# Patient Record
Sex: FEMALE | Race: WHITE | NOT HISPANIC OR LATINO | URBAN - METROPOLITAN AREA
[De-identification: names, ages, dates, MRNs, and addresses within clinical notes are randomized per-mention and may not be internally consistent; named-entity substitution may affect disease eponyms.]

---

## 2017-02-15 ENCOUNTER — OUTPATIENT (OUTPATIENT)
Dept: OUTPATIENT SERVICES | Facility: HOSPITAL | Age: 67
LOS: 1 days | Discharge: HOME | End: 2017-02-15

## 2017-06-27 DIAGNOSIS — Z12.31 ENCOUNTER FOR SCREENING MAMMOGRAM FOR MALIGNANT NEOPLASM OF BREAST: ICD-10-CM

## 2017-09-25 ENCOUNTER — OUTPATIENT (OUTPATIENT)
Dept: OUTPATIENT SERVICES | Facility: HOSPITAL | Age: 67
LOS: 1 days | Discharge: HOME | End: 2017-09-25

## 2017-09-25 DIAGNOSIS — C83.58 LYMPHOBLASTIC (DIFFUSE) LYMPHOMA, LYMPH NODES OF MULTIPLE SITES: ICD-10-CM

## 2017-10-30 ENCOUNTER — EMERGENCY (EMERGENCY)
Facility: HOSPITAL | Age: 67
LOS: 0 days | Discharge: HOME | End: 2017-10-30

## 2017-10-30 DIAGNOSIS — Z88.0 ALLERGY STATUS TO PENICILLIN: ICD-10-CM

## 2017-10-30 DIAGNOSIS — S60.511A ABRASION OF RIGHT HAND, INITIAL ENCOUNTER: ICD-10-CM
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
## 2017-10-30 DIAGNOSIS — M25.572 PAIN IN LEFT ANKLE AND JOINTS OF LEFT FOOT: ICD-10-CM

## 2017-10-30 DIAGNOSIS — Z23 ENCOUNTER FOR IMMUNIZATION: ICD-10-CM
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
## 2018-03-01 ENCOUNTER — OUTPATIENT (OUTPATIENT)
Dept: OUTPATIENT SERVICES | Facility: HOSPITAL | Age: 68
LOS: 1 days | Discharge: HOME | End: 2018-03-01

## 2018-03-01 DIAGNOSIS — Z12.31 ENCOUNTER FOR SCREENING MAMMOGRAM FOR MALIGNANT NEOPLASM OF BREAST: ICD-10-CM

## 2018-03-02 ENCOUNTER — OUTPATIENT (OUTPATIENT)
Dept: OUTPATIENT SERVICES | Facility: HOSPITAL | Age: 68
LOS: 1 days | Discharge: HOME | End: 2018-03-02

## 2018-03-02 DIAGNOSIS — R42 DIZZINESS AND GIDDINESS: ICD-10-CM

## 2018-05-07 ENCOUNTER — APPOINTMENT (OUTPATIENT)
Dept: CARDIOLOGY | Facility: CLINIC | Age: 68
End: 2018-05-07

## 2018-05-07 VITALS
WEIGHT: 132 LBS | HEIGHT: 63 IN | HEART RATE: 87 BPM | BODY MASS INDEX: 23.39 KG/M2 | SYSTOLIC BLOOD PRESSURE: 98 MMHG | DIASTOLIC BLOOD PRESSURE: 62 MMHG

## 2018-05-07 DIAGNOSIS — R20.0 ANESTHESIA OF SKIN: ICD-10-CM

## 2018-05-07 DIAGNOSIS — R20.2 PARESTHESIA OF SKIN: ICD-10-CM

## 2018-05-07 DIAGNOSIS — Z80.0 FAMILY HISTORY OF MALIGNANT NEOPLASM OF DIGESTIVE ORGANS: ICD-10-CM

## 2018-05-07 DIAGNOSIS — Z87.39 PERSONAL HISTORY OF OTHER DISEASES OF THE MUSCULOSKELETAL SYSTEM AND CONNECTIVE TISSUE: ICD-10-CM

## 2018-06-14 ENCOUNTER — APPOINTMENT (OUTPATIENT)
Dept: CARDIOLOGY | Facility: CLINIC | Age: 68
End: 2018-06-14

## 2018-09-25 ENCOUNTER — OUTPATIENT (OUTPATIENT)
Dept: OUTPATIENT SERVICES | Facility: HOSPITAL | Age: 68
LOS: 1 days | Discharge: HOME | End: 2018-09-25

## 2018-09-25 DIAGNOSIS — R51 HEADACHE: ICD-10-CM

## 2018-11-04 ENCOUNTER — OUTPATIENT (OUTPATIENT)
Dept: OUTPATIENT SERVICES | Facility: HOSPITAL | Age: 68
LOS: 1 days | Discharge: HOME | End: 2018-11-04

## 2018-11-04 DIAGNOSIS — G45.8 OTHER TRANSIENT CEREBRAL ISCHEMIC ATTACKS AND RELATED SYNDROMES: ICD-10-CM

## 2018-11-04 DIAGNOSIS — R42 DIZZINESS AND GIDDINESS: ICD-10-CM

## 2018-11-04 DIAGNOSIS — R51 HEADACHE: ICD-10-CM

## 2019-01-18 ENCOUNTER — LABORATORY RESULT (OUTPATIENT)
Age: 69
End: 2019-01-18

## 2019-01-18 ENCOUNTER — APPOINTMENT (OUTPATIENT)
Dept: HEMATOLOGY ONCOLOGY | Facility: CLINIC | Age: 69
End: 2019-01-18

## 2019-01-18 VITALS
TEMPERATURE: 96.5 F | HEART RATE: 101 BPM | BODY MASS INDEX: 22.86 KG/M2 | RESPIRATION RATE: 16 BRPM | HEIGHT: 63 IN | DIASTOLIC BLOOD PRESSURE: 72 MMHG | SYSTOLIC BLOOD PRESSURE: 121 MMHG | WEIGHT: 129 LBS

## 2019-01-18 LAB
ALBUMIN SERPL ELPH-MCNC: 4.6 G/DL
ALP BLD-CCNC: 65 U/L
ALT SERPL-CCNC: 13 U/L
ANION GAP SERPL CALC-SCNC: 20 MMOL/L
APTT BLD: 31.9 SEC
AST SERPL-CCNC: 16 U/L
BILIRUB DIRECT SERPL-MCNC: <0.2 MG/DL
BILIRUB INDIRECT SERPL-MCNC: >0.1 MG/DL
BILIRUB SERPL-MCNC: 0.3 MG/DL
BUN SERPL-MCNC: 15 MG/DL
CALCIUM SERPL-MCNC: 9.8 MG/DL
CHLORIDE SERPL-SCNC: 106 MMOL/L
CO2 SERPL-SCNC: 24 MMOL/L
CREAT SERPL-MCNC: 0.9 MG/DL
GLUCOSE SERPL-MCNC: 86 MG/DL
HCT VFR BLD CALC: 40.9 %
HGB BLD-MCNC: 12.9 G/DL
INR PPP: 1.02 RATIO
LDH SERPL-CCNC: 208
MCHC RBC-ENTMCNC: 28.4 PG
MCHC RBC-ENTMCNC: 31.5 G/DL
MCV RBC AUTO: 90.1 FL
PLATELET # BLD AUTO: 294 K/UL
PMV BLD: 11.1 FL
POTASSIUM SERPL-SCNC: 5.4 MMOL/L
PROT SERPL-MCNC: 7.3 G/DL
PT BLD: 11.7 SEC
RBC # BLD: 4.54 M/UL
RBC # FLD: 14.8 %
SODIUM SERPL-SCNC: 150 MMOL/L
WBC # FLD AUTO: 6.74 K/UL

## 2019-01-18 NOTE — REVIEW OF SYSTEMS
[Fatigue] : fatigue [Negative] : Allergic/Immunologic [Fever] : no fever [Chills] : no chills [Night Sweats] : no night sweats [Recent Change In Weight] : ~T no recent weight change

## 2019-01-18 NOTE — HISTORY OF PRESENT ILLNESS
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 Each time treated with RT. \par \par Biopsy on 12.5.18: left preauricular area: limited findings; can not assess the depth; differential includes pseudolymphoma or evolving lymphoproliferative disorder\par \par PMH: follicular lymphoma stage IE\par FH: colon cancer in father and brother; lymhohistocytosis in mother\par SH; no smoking

## 2019-01-18 NOTE — ASSESSMENT
[Palliative] : Goals of care discussed with patient: Palliative [FreeTextEntry1] : Folliucular Lymphoma: \par \par 1.send to surgery to repeat biopsy\par 2. f/u Rad Onc\par 3. repeat pet scan\par 4. labs\par \par if lymphoma is diagnosed and RT is not an option, will discuss chemotherapy for palliative not curative intent (rituxan jorge x4).\par \par had prolonged discussion; answered all questions\par \par ADDENDUM: \par : Called a patient. Advised to repeat test asap in ER> she said she will go to her pmd tomorrow\par

## 2019-01-25 ENCOUNTER — OUTPATIENT (OUTPATIENT)
Dept: OUTPATIENT SERVICES | Facility: HOSPITAL | Age: 69
LOS: 1 days | Discharge: HOME | End: 2019-01-25

## 2019-01-25 ENCOUNTER — APPOINTMENT (OUTPATIENT)
Dept: SURGERY | Facility: CLINIC | Age: 69
End: 2019-01-25
Payer: MEDICARE

## 2019-01-25 ENCOUNTER — LABORATORY RESULT (OUTPATIENT)
Age: 69
End: 2019-01-25

## 2019-01-25 VITALS
DIASTOLIC BLOOD PRESSURE: 72 MMHG | SYSTOLIC BLOOD PRESSURE: 124 MMHG | WEIGHT: 129 LBS | HEIGHT: 63 IN | BODY MASS INDEX: 22.86 KG/M2

## 2019-01-25 DIAGNOSIS — C82.90 FOLLICULAR LYMPHOMA, UNSPECIFIED, UNSPECIFIED SITE: ICD-10-CM

## 2019-01-25 PROCEDURE — 11104 PUNCH BX SKIN SINGLE LESION: CPT

## 2019-01-25 PROCEDURE — 11105 PUNCH BX SKIN EA SEP/ADDL: CPT

## 2019-01-25 PROCEDURE — 99202 OFFICE O/P NEW SF 15 MIN: CPT

## 2019-01-25 NOTE — HISTORY OF PRESENT ILLNESS
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 Each time treated with RT. \par Biopsy on 12.5.18: left preauricular area: limited findings; can not assess the depth; differential includes pseudolymphoma or evolving lymphoproliferative disorder\par \par PMH: follicular lymphoma stage IE\par FH: colon cancer in father and brother; lymhohistocytosis in mother\par SH; no smoking.

## 2019-01-25 NOTE — ASSESSMENT
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 Each time treated with RT. \par Biopsy on 12.5.18: left preauricular area: limited findings; can not assess the depth; differential includes pseudolymphoma or evolving lymphoproliferative disorder\par \par \par Procedure:\par  Punch biopsy of face x 2\par \par A consent was taken before the procedure. \par After a time out and cleaning the area with a antiseptic solution, an injection of local anesthesia the with the 2mm punch biopsy device incision was made  in the pre auricular area. The incision was then made deeper and the lesion was excised with a scalpel. \par \par a second such biopsy was done inferior to it. \par Once the lesion was removed it was sent of to pathology. \par The area was irrigated and hemostasis was confirmed.\par \par The incision was closed with absorbable sutures with  Monocryl for the epidermis. \par \par The Post operative care was explained to the patient. She was counselled on diet , exercise and wound care.\par We discussed the pathology and surgery with her.\par We discussed the importance of close follow up. \par We informed that she needs to follow up in 1 week\par We also informed that she can call us if anything changes or has any questions.\par \par

## 2019-01-25 NOTE — PHYSICAL EXAM
[Respiratory Effort] : normal respiratory effort [Calm] : calm [JVD] : no jugular venous distention  [Murmur] : murmur was appreciated  [Purpura] : no purpura  [de-identified] : normal [de-identified] : some scarring of the left face [de-identified] : Normal

## 2019-01-30 ENCOUNTER — OUTPATIENT (OUTPATIENT)
Dept: OUTPATIENT SERVICES | Facility: HOSPITAL | Age: 69
LOS: 1 days | Discharge: HOME | End: 2019-01-30

## 2019-01-30 DIAGNOSIS — C82.90 FOLLICULAR LYMPHOMA, UNSPECIFIED, UNSPECIFIED SITE: ICD-10-CM

## 2019-01-30 DIAGNOSIS — C82.91 FOLLICULAR LYMPHOMA, UNSPECIFIED, LYMPH NODES OF HEAD, FACE, AND NECK: ICD-10-CM

## 2019-01-30 LAB — GLUCOSE BLDC GLUCOMTR-MCNC: 82 MG/DL — SIGNIFICANT CHANGE UP (ref 70–99)

## 2019-02-01 ENCOUNTER — APPOINTMENT (OUTPATIENT)
Dept: SURGERY | Facility: CLINIC | Age: 69
End: 2019-02-01
Payer: MEDICARE

## 2019-02-01 VITALS
BODY MASS INDEX: 23.04 KG/M2 | HEIGHT: 63 IN | DIASTOLIC BLOOD PRESSURE: 68 MMHG | WEIGHT: 130 LBS | SYSTOLIC BLOOD PRESSURE: 120 MMHG

## 2019-02-01 PROCEDURE — 99214 OFFICE O/P EST MOD 30 MIN: CPT

## 2019-02-01 NOTE — HISTORY OF PRESENT ILLNESS
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 Each time treated with RT. \par Biopsy on 12.5.18: left preauricular area: limited findings; can not assess the depth; differential includes pseudolymphoma or evolving lymphoproliferative disorder\par \par PMH: follicular lymphoma stage IE\par FH: colon cancer in father and brother; lympho histocytosis in mother\par SH; no smoking. \par \par On 1/25:  Two biopsies were taken . The pathology is still pending . It has healed well.

## 2019-02-01 NOTE — PHYSICAL EXAM
[Respiratory Effort] : normal respiratory effort [Murmur] : murmur was appreciated  [Calm] : calm [JVD] : no jugular venous distention  [Purpura] : no purpura  [de-identified] : normal [de-identified] : some scarring of the left face [de-identified] : Normal

## 2019-02-08 ENCOUNTER — APPOINTMENT (OUTPATIENT)
Dept: HEMATOLOGY ONCOLOGY | Facility: CLINIC | Age: 69
End: 2019-02-08

## 2019-02-08 ENCOUNTER — OUTPATIENT (OUTPATIENT)
Dept: OUTPATIENT SERVICES | Facility: HOSPITAL | Age: 69
LOS: 1 days | Discharge: HOME | End: 2019-02-08

## 2019-02-08 VITALS
HEART RATE: 96 BPM | TEMPERATURE: 97 F | RESPIRATION RATE: 16 BRPM | BODY MASS INDEX: 23.04 KG/M2 | DIASTOLIC BLOOD PRESSURE: 59 MMHG | SYSTOLIC BLOOD PRESSURE: 103 MMHG | HEIGHT: 63 IN | WEIGHT: 130 LBS

## 2019-02-08 DIAGNOSIS — C82.90 FOLLICULAR LYMPHOMA, UNSPECIFIED, UNSPECIFIED SITE: ICD-10-CM

## 2019-02-08 NOTE — ASSESSMENT
[Palliative] : Goals of care discussed with patient: Palliative [FreeTextEntry1] : Folliucular Lymphoma: \par \par 1 repeat biopsy 1.25.19: negative\par 2. f/u Rad Onc appreciated\par 3. repeat pet scan reviewed: no evidence of distant disease\par \par - repeat bipsy in 2-3 months and see me after that; pt to make an appt w/ surgery to get biopsy done\par if lymphoma is diagnosed and RT is not an option, will discuss chemotherapy for palliative not curative intent (rituxan jorge x4).\par \par had prolonged discussion; answered all questions\par

## 2019-02-08 NOTE — HISTORY OF PRESENT ILLNESS
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 Each time treated with RT. \par \par Biopsy on 12.5.18: left preauricular area: limited findings; can not assess the depth; differential includes pseudolymphoma or evolving lymphoproliferative disorder\par \par PMH: follicular lymphoma stage IE\par FH: colon cancer in father and brother; lymphohistocytosis in mother\par SH; no smoking [de-identified] : 1.25.19: biopsy: no lymphoma\par \par \par PET/CT 1/30/19  IMPRESSION: Since 9/25/2017: 1. No definite sites of pathologic FDG uptake. 2. New 0.9 cm right anterior lower lobe linear nodular opacity, max 3.0 SUV, by pattern likely represents focal atelectasis. Chest CT in 3 months can be performed to assess for stability. 3. Persistent mild FDG uptake within subcentimeter bilateral hilar lymph nodes; nonspecific. 4. Mild diffuse uptake within the stomach, correlate for gastritis (max 6.3 SUV).

## 2019-04-12 ENCOUNTER — APPOINTMENT (OUTPATIENT)
Dept: SURGERY | Facility: CLINIC | Age: 69
End: 2019-04-12

## 2019-04-19 ENCOUNTER — APPOINTMENT (OUTPATIENT)
Dept: SURGERY | Facility: CLINIC | Age: 69
End: 2019-04-19
Payer: MEDICARE

## 2019-04-19 ENCOUNTER — LABORATORY RESULT (OUTPATIENT)
Age: 69
End: 2019-04-19

## 2019-04-19 ENCOUNTER — OUTPATIENT (OUTPATIENT)
Dept: OUTPATIENT SERVICES | Facility: HOSPITAL | Age: 69
LOS: 1 days | Discharge: HOME | End: 2019-04-19

## 2019-04-19 VITALS
HEIGHT: 63 IN | SYSTOLIC BLOOD PRESSURE: 92 MMHG | BODY MASS INDEX: 22.86 KG/M2 | DIASTOLIC BLOOD PRESSURE: 58 MMHG | WEIGHT: 129 LBS

## 2019-04-19 PROCEDURE — 11104 PUNCH BX SKIN SINGLE LESION: CPT

## 2019-04-19 PROCEDURE — 99214 OFFICE O/P EST MOD 30 MIN: CPT | Mod: 25

## 2019-04-19 PROCEDURE — 11105 PUNCH BX SKIN EA SEP/ADDL: CPT

## 2019-04-19 NOTE — ASSESSMENT
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 Each time treated with RT. \par Biopsy on 12.5.18: left preauricular area: limited findings; can not assess the depth; differential includes pseudolymphoma or evolving lymphoproliferative disorder\par \par A consent was taken before the procedure. \par \par After a time out and cleaning the area with a antiseptic solution, an injection of local anesthesia the incision was made around the most prominent area with a 2 mm punch biopsy  needle. The incision was then made deeper and the lesion was excised with a scalpel. Caution was taken to not enter the lesion. Once the lesions was removed it was sent of to pathology. \par 4 such biopsy were done. starting in the pre auricular area going 1-2 cm medially . 1 being the lateral most and 4 being the medial most. \par The area was irrigated and hemostasis was confirmed. \par The incision was closed with absorbable sutures - Monocryl for the epidermis. Steri-Strips was put over it.\par \par The Post operative care was explained to the patient. \par \par We discussed the importance of close follow up. \par We informed that she needs to follow up in 1 week  for suture removal. \par We also informed that she can call us if anything changes or has any questions.\par \par \par

## 2019-04-19 NOTE — PHYSICAL EXAM
[JVD] : no jugular venous distention  [Respiratory Effort] : normal respiratory effort [Murmur] : murmur was appreciated  [Purpura] : no purpura  [de-identified] : normal [Calm] : calm [de-identified] : some scarring of the left face [de-identified] : Normal

## 2019-04-19 NOTE — HISTORY OF PRESENT ILLNESS
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 Each time treated with RT. \par Biopsy on 12.5.18: left preauricular area: limited findings; can not assess the depth; differential includes pseudolymphoma or evolving lymphoproliferative disorder\par \par PMH: follicular lymphoma stage IE\par FH: colon cancer in father and brother; lympho histocytosis in mother\par SH; no smoking. \par \par On 1/25:  Two biopsies were taken . The pathology is still pending . It has healed well.  Plan was to repeat the biopsy.\par 4/19: The biopsy was done \par

## 2019-04-22 DIAGNOSIS — C82.90 FOLLICULAR LYMPHOMA, UNSPECIFIED, UNSPECIFIED SITE: ICD-10-CM

## 2019-04-26 ENCOUNTER — APPOINTMENT (OUTPATIENT)
Dept: SURGERY | Facility: CLINIC | Age: 69
End: 2019-04-26
Payer: MEDICARE

## 2019-04-26 VITALS
WEIGHT: 136 LBS | DIASTOLIC BLOOD PRESSURE: 76 MMHG | BODY MASS INDEX: 24.1 KG/M2 | HEIGHT: 63 IN | SYSTOLIC BLOOD PRESSURE: 122 MMHG

## 2019-04-26 PROCEDURE — 99213 OFFICE O/P EST LOW 20 MIN: CPT

## 2019-04-26 NOTE — ASSESSMENT
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 Each time treated with RT. \par Biopsy on 12.5.18: left preauricular area: limited findings; can not assess the depth; differential includes pseudolymphoma or evolving lymphoproliferative disorder\par \par 4 more biopsies were taken \par they are healing well. \par \par The Post operative care was explained to the patient. \par \par We discussed the importance of close follow up. \par We informed that she needs to follow up with Dr. Cuello\par We also informed that she can call us if anything changes or has any questions.\par \par \par

## 2019-04-26 NOTE — HISTORY OF PRESENT ILLNESS
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 Each time treated with RT. \par Biopsy on 12.5.18: left preauricular area: limited findings; can not assess the depth; differential includes pseudolymphoma or evolving lymphoproliferative disorder\par \par PMH: follicular lymphoma stage IE\par FH: colon cancer in father and brother; lympho histocytosis in mother\par SH; no smoking. \par \par On 1/25:  Two biopsies were taken . The pathology is still pending . It has healed well.  Plan was to repeat the biopsy.\par 4/19:  4 biopsies was done again .  \par

## 2019-04-26 NOTE — PHYSICAL EXAM
[Respiratory Effort] : normal respiratory effort [Murmur] : murmur was appreciated  [Calm] : calm [JVD] : no jugular venous distention  [Purpura] : no purpura  [de-identified] : normal [de-identified] : left face 4 biopsy sites healing well [de-identified] : Normal

## 2019-05-07 ENCOUNTER — APPOINTMENT (OUTPATIENT)
Dept: HEMATOLOGY ONCOLOGY | Facility: CLINIC | Age: 69
End: 2019-05-07

## 2019-05-07 ENCOUNTER — OUTPATIENT (OUTPATIENT)
Dept: OUTPATIENT SERVICES | Facility: HOSPITAL | Age: 69
LOS: 1 days | Discharge: HOME | End: 2019-05-07

## 2019-05-07 VITALS
SYSTOLIC BLOOD PRESSURE: 107 MMHG | RESPIRATION RATE: 14 BRPM | HEIGHT: 63 IN | HEART RATE: 83 BPM | DIASTOLIC BLOOD PRESSURE: 73 MMHG | BODY MASS INDEX: 23.39 KG/M2 | WEIGHT: 132 LBS | TEMPERATURE: 98.1 F

## 2019-05-07 NOTE — HISTORY OF PRESENT ILLNESS
[de-identified] : 1.25.19: biopsy: no lymphoma\par \par \par PET/CT 1/30/19  IMPRESSION: Since 9/25/2017: 1. No definite sites of pathologic FDG uptake. 2. New 0.9 cm right anterior lower lobe linear nodular opacity, max 3.0 SUV, by pattern likely represents focal atelectasis. Chest CT in 3 months can be performed to assess for stability. 3. Persistent mild FDG uptake within subcentimeter bilateral hilar lymph nodes; nonspecific. 4. Mild diffuse uptake within the stomach, correlate for gastritis (max 6.3 SUV).  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 Each time treated with RT. \par \par Biopsy on 12.5.18: left preauricular area: limited findings; can not assess the depth; differential includes pseudolymphoma or evolving lymphoproliferative disorder\par \par PMH: follicular lymphoma stage IE\par FH: colon cancer in father and brother; lymphohistocytosis in mother\par SH; no smoking

## 2019-05-07 NOTE — REVIEW OF SYSTEMS
[Fever] : no fever [Chills] : no chills [Night Sweats] : no night sweats [Fatigue] : fatigue [Recent Change In Weight] : ~T no recent weight change [Negative] : Heme/Lymph

## 2019-05-07 NOTE — ASSESSMENT
[Palliative] : Goals of care discussed with patient: Palliative [FreeTextEntry1] : Folliucular Lymphoma: \par \par 1 repeat biopsy 1.25.19: negative and repeat biopsy 4/25/19 and 4/29/19: negative for lymphoma\par 2. f/u Rad Onc appreciated\par 3. repeat pet scan reviewed: no evidence of distant disease\par \par - repeat bipsy in 6 months and see me after that; pt to make an appt w/ surgery to get biopsy done\par if lymphoma is diagnosed and RT is not an option, will discuss chemotherapy for palliative not curative intent (rituxan ojrge x4).\par \par had prolonged discussion; answered all questions\par \par RTC /LABs in 6 mos\par

## 2019-05-10 DIAGNOSIS — C82.90 FOLLICULAR LYMPHOMA, UNSPECIFIED, UNSPECIFIED SITE: ICD-10-CM

## 2019-09-03 ENCOUNTER — OUTPATIENT (OUTPATIENT)
Dept: OUTPATIENT SERVICES | Facility: HOSPITAL | Age: 69
LOS: 1 days | Discharge: HOME | End: 2019-09-03
Payer: MEDICARE

## 2019-09-03 DIAGNOSIS — Z12.31 ENCOUNTER FOR SCREENING MAMMOGRAM FOR MALIGNANT NEOPLASM OF BREAST: ICD-10-CM

## 2019-09-03 PROCEDURE — 77063 BREAST TOMOSYNTHESIS BI: CPT | Mod: 26

## 2019-09-03 PROCEDURE — 77067 SCR MAMMO BI INCL CAD: CPT | Mod: 26

## 2019-10-08 ENCOUNTER — OUTPATIENT (OUTPATIENT)
Dept: OUTPATIENT SERVICES | Facility: HOSPITAL | Age: 69
LOS: 1 days | Discharge: HOME | End: 2019-10-08

## 2019-10-08 ENCOUNTER — APPOINTMENT (OUTPATIENT)
Dept: HEMATOLOGY ONCOLOGY | Facility: CLINIC | Age: 69
End: 2019-10-08
Payer: MEDICARE

## 2019-10-08 VITALS
TEMPERATURE: 97.5 F | RESPIRATION RATE: 16 BRPM | HEART RATE: 102 BPM | WEIGHT: 131 LBS | BODY MASS INDEX: 23.21 KG/M2 | SYSTOLIC BLOOD PRESSURE: 101 MMHG | HEIGHT: 63 IN | DIASTOLIC BLOOD PRESSURE: 70 MMHG

## 2019-10-08 PROCEDURE — 99214 OFFICE O/P EST MOD 30 MIN: CPT

## 2019-10-08 NOTE — HISTORY OF PRESENT ILLNESS
[de-identified] : 1.25.19: biopsy: no lymphoma\par \par \par PET/CT 1/30/19  IMPRESSION: Since 9/25/2017: 1. No definite sites of pathologic FDG uptake. 2. New 0.9 cm right anterior lower lobe linear nodular opacity, max 3.0 SUV, by pattern likely represents focal atelectasis. Chest CT in 3 months can be performed to assess for stability. 3. Persistent mild FDG uptake within subcentimeter bilateral hilar lymph nodes; nonspecific. 4. Mild diffuse uptake within the stomach, correlate for gastritis (max 6.3 SUV). \par \par 10/8/2019 : The patient is here for follow up. She had a skin biopsy of left periauricular region in August 2019, which was negative for lymphoma . No RT needed . Since last visit, she has no new complaints, no fever, no weight loss, no palpable masses or lymph nodes. \par \par DANIELLE METCALF                      2\par \par \par \par Addendum Report\par \par \par \par \par Addendum\par Additional levels are examined on part 2 which show scant\par epidermis with solar\par elastosis.\par \par Verified by: Jg Alicea M.D.\par (Electronic Signature)\par Reported on: 04/29/19 15:52 EDT, [address] [facility] [place],\[place] [address]\par _________________________________________________________________\par \par \par Surgical Final Report\par \par \par \par \par Final Diagnosis\par 1. Skin, left facial lateral, biopsy:\par - Actinic keratosis.\par \par 2. Skin, left facial, biopsy (#2):\par - Fibroadipose tissue, insufficient for diagnosis.\par - Additional levels pending.\par \par 3. Skin, left facial, biopsy (#3):\par - Actinic keratosis.\par \par 4. Skin, left facial medial, biopsy:\par - Actinic keratosis.\par \par Verified by: Jg Alicea M.D.\par (Electronic Signature)\par Reported on: 04/25/19 18:04 EDT, [address], [place],\[place] [address]\par _________________________________________________________________\par \par Comment\par This case was reviewed in the department and the diagnosis\par represents a consensus opinion.\par \par Clinical History\par Punch biopsy of left facial skin\par \par Specimen(s) Submitted\par 1     Punch biopsy left facial skin lateral\par 2     Punch biopsy left facial skin\par 3     Punch biopsy left facial skin\par \par \par \par \par \par DANIELLE METCALF                      2\par \par \par \par Surgical Final Report\par \par \par \par \par 4     Punch biopsy left facial skin medial\par \par Gross Description\par 1. The specimen is received in formalin, and labeled "punch\par biopsy of the lateral"\par and consists of a punch biopsy of tan skin measuring 0.1 cm in\par diameter x 0.2 cm in depth.  The specimen is submitted entirely.\par (1 block)\par \par 2. The specimen is received in formalin, and labeled "punch\par biopsy of periphery"\par and consists of a punch biopsy of tan skin measuring 0.1 cm in\par diameter x 0.2 cm in depth.  The specimen is submitted entirely.\par (1 block)\par \par 3. The specimen is received in formalin, and labeled "punch\par biopsy #3" and consists of a punch biopsy of tan skin measuring\par 0.1 cm in diameter x 0.5 cm in depth.  The specimen is submitted\par entirely. (1 block)\par \par 4. The specimen is received in formalin, and labeled "punch\par biopsy from medial"and consists of a punch biopsy of tan skin\par measuring 0.1 cm in diameter x 0.3 cm in depth.  The specimen is\par submitted entirely. 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 Each time treated with RT. \par \par Biopsy on 12.5.18: left preauricular area: limited findings; can not assess the depth; differential includes pseudolymphoma or evolving lymphoproliferative disorder\par \par PMH: follicular lymphoma stage IE\par FH: colon cancer in father and brother; lymphohistocytosis in mother\par SH; no smoking

## 2019-10-08 NOTE — ASSESSMENT
[Palliative] : Goals of care discussed with patient: Palliative [FreeTextEntry1] : # Low grade skin follicular Lymphoma: \par \par 1 repeat biopsy 1.25.19: negative and repeat biopsy 4/25/19 and 4/29/19: negative for lymphoma \par    Repeat biopsy 8/2019 : negative for lymphoma\par 2. f/u Rad Onc appreciated\par 3. repeat pet scan (1/2019) reviewed: no evidence of distant disease\par \par - Follow up with dermatology and repeat biopsy as needed. \par If lymphoma is diagnosed and RT is not an option , we will discuss  chemotherapy for palliative not curative intent (rituxan weely x4).\par \par The patient was seen and examined with Dr Lyons who agreed with the above plan\par \par had prolonged discussion; answered all questions\par \par RTC /LABs in 1 year\par f/u dermatology\par

## 2019-10-08 NOTE — PHYSICAL EXAM
[Fully active, able to carry on all pre-disease performance without restriction] : Status 0 - Fully active, able to carry on all pre-disease performance without restriction [Normal] : affect appropriate [de-identified] : left pre auricular site : 2 logan of redness , no palpable raised masses or lesions

## 2019-10-09 DIAGNOSIS — C82.90 FOLLICULAR LYMPHOMA, UNSPECIFIED, UNSPECIFIED SITE: ICD-10-CM

## 2020-09-05 ENCOUNTER — OUTPATIENT (OUTPATIENT)
Dept: OUTPATIENT SERVICES | Facility: HOSPITAL | Age: 70
LOS: 1 days | Discharge: HOME | End: 2020-09-05
Payer: MEDICARE

## 2020-09-05 DIAGNOSIS — Z12.31 ENCOUNTER FOR SCREENING MAMMOGRAM FOR MALIGNANT NEOPLASM OF BREAST: ICD-10-CM

## 2020-09-05 PROCEDURE — 77063 BREAST TOMOSYNTHESIS BI: CPT | Mod: 26

## 2020-09-05 PROCEDURE — 77067 SCR MAMMO BI INCL CAD: CPT | Mod: 26

## 2020-09-24 NOTE — ASSESSMENT
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 Each time treated with RT. \par Biopsy on 12.5.18: left preauricular area: limited findings; can not assess the depth; differential includes pseudolymphoma or evolving lymphoproliferative disorder\par \par Well healed scars .\par Two sutures were partially removed. \par \par The Post operative care was explained to the patient. \par \par We discussed the importance of close follow up. \par We informed that she needs to follow up with the oncologist. \par We also informed that she can call us if anything changes or has any questions.\par \par If she needs more biopsy she should return \par \par  n/a n/a

## 2020-10-23 ENCOUNTER — APPOINTMENT (OUTPATIENT)
Dept: HEMATOLOGY ONCOLOGY | Facility: CLINIC | Age: 70
End: 2020-10-23

## 2020-10-23 ENCOUNTER — LABORATORY RESULT (OUTPATIENT)
Age: 70
End: 2020-10-23

## 2020-10-23 DIAGNOSIS — Z00.00 ENCOUNTER FOR GENERAL ADULT MEDICAL EXAMINATION W/OUT ABNORMAL FINDINGS: ICD-10-CM

## 2020-10-23 LAB
ALBUMIN SERPL ELPH-MCNC: 4.3 G/DL
ALP BLD-CCNC: 67 U/L
ALT SERPL-CCNC: 17 U/L
ANION GAP SERPL CALC-SCNC: 11 MMOL/L
AST SERPL-CCNC: 18 U/L
BILIRUB SERPL-MCNC: 0.3 MG/DL
BUN SERPL-MCNC: 16 MG/DL
CALCIUM SERPL-MCNC: 9.5 MG/DL
CHLORIDE SERPL-SCNC: 106 MMOL/L
CO2 SERPL-SCNC: 27 MMOL/L
CREAT SERPL-MCNC: 0.9 MG/DL
GLUCOSE SERPL-MCNC: 114 MG/DL
HCT VFR BLD CALC: 39.4 %
HGB BLD-MCNC: 12.5 G/DL
MCHC RBC-ENTMCNC: 28.1 PG
MCHC RBC-ENTMCNC: 31.7 G/DL
MCV RBC AUTO: 88.5 FL
PLATELET # BLD AUTO: 254 K/UL
PMV BLD: 11 FL
POTASSIUM SERPL-SCNC: 4 MMOL/L
PROT SERPL-MCNC: 6.7 G/DL
RBC # BLD: 4.45 M/UL
RBC # FLD: 14.9 %
SODIUM SERPL-SCNC: 144 MMOL/L
WBC # FLD AUTO: 7.17 K/UL

## 2020-10-27 ENCOUNTER — APPOINTMENT (OUTPATIENT)
Dept: HEMATOLOGY ONCOLOGY | Facility: CLINIC | Age: 70
End: 2020-10-27
Payer: MEDICARE

## 2020-10-27 ENCOUNTER — OUTPATIENT (OUTPATIENT)
Dept: OUTPATIENT SERVICES | Facility: HOSPITAL | Age: 70
LOS: 1 days | Discharge: HOME | End: 2020-10-27

## 2020-10-27 VITALS
DIASTOLIC BLOOD PRESSURE: 73 MMHG | SYSTOLIC BLOOD PRESSURE: 123 MMHG | HEIGHT: 63 IN | RESPIRATION RATE: 14 BRPM | WEIGHT: 136 LBS | TEMPERATURE: 97.3 F | BODY MASS INDEX: 24.1 KG/M2 | HEART RATE: 87 BPM

## 2020-10-27 PROCEDURE — 99213 OFFICE O/P EST LOW 20 MIN: CPT

## 2020-10-27 NOTE — ASSESSMENT
[Palliative] : Goals of care discussed with patient: Palliative [FreeTextEntry1] : 70 F Low grade skin follicular Lymphoma: \par \par 1 repeat biopsy 1.25.19: negative and repeat biopsy 4/25/19 and 4/29/19: negative for lymphoma \par    Repeat biopsy 8/2019 : negative for lymphoma\par 2. f/u Rad Onc appreciated\par 3. repeat pet scan (1/2019) reviewed: no evidence of distant disease\par \par - Follow up with dermatology and repeat biopsy as needed. \par If lymphoma is diagnosed and RT is not an option , we will discuss  chemotherapy for palliative not curative intent (rituxan nathanielely x4).\par \par \par \par had prolonged discussion; answered all questions\par \par RTC /LABs in 1 year\par f/u dermatology\par

## 2020-10-27 NOTE — PHYSICAL EXAM
[Fully active, able to carry on all pre-disease performance without restriction] : Status 0 - Fully active, able to carry on all pre-disease performance without restriction [Normal] : affect appropriate [de-identified] : left pre auricular site : 2 logan of redness , no palpable raised masses or lesions

## 2020-10-27 NOTE — CONSULT LETTER
[Dear  ___] : Dear  [unfilled], [Consult Letter:] : I had the pleasure of evaluating your patient, [unfilled]. [Please see my note below.] : Please see my note below. [Sincerely,] : Sincerely, [FreeTextEntry2] : Marina Goldberg ,MD [FreeTextEntry3] : Alan Lyons DO\par Attending Physician,\par Hematology/ Medical Oncology\par [phone] office\par \par

## 2020-10-27 NOTE — HISTORY OF PRESENT ILLNESS
[de-identified] : 1.25.19: biopsy: no lymphoma\par \par \par PET/CT 1/30/19  IMPRESSION: Since 9/25/2017: 1. No definite sites of pathologic FDG uptake. 2. New 0.9 cm right anterior lower lobe linear nodular opacity, max 3.0 SUV, by pattern likely represents focal atelectasis. Chest CT in 3 months can be performed to assess for stability. 3. Persistent mild FDG uptake within subcentimeter bilateral hilar lymph nodes; nonspecific. 4. Mild diffuse uptake within the stomach, correlate for gastritis (max 6.3 SUV). \par \par 10/8/2019 : The patient is here for follow up. She had a skin biopsy of left periauricular region in August 2019, which was negative for lymphoma . No RT needed . Since last visit, she has no new complaints, no fever, no weight loss, no palpable masses or lymph nodes. \par \par DANIELLE METCALF                      2\par \par \par \par Addendum Report\par \par \par \par \par Addendum\par Additional levels are examined on part 2 which show scant\par epidermis with solar\par elastosis.\par \par Verified by: Jg Alicea M.D.\par (Electronic Signature)\par Reported on: 04/29/19 15:52 EDT, [address] [facility], [place],\[place] [address]\par _________________________________________________________________\par \par \par Surgical Final Report\par \par \par \par \par Final Diagnosis\par 1. Skin, left facial lateral, biopsy:\par - Actinic keratosis.\par \par 2. Skin, left facial, biopsy (#2):\par - Fibroadipose tissue, insufficient for diagnosis.\par - Additional levels pending.\par \par 3. Skin, left facial, biopsy (#3):\par - Actinic keratosis.\par \par 4. Skin, left facial medial, biopsy:\par - Actinic keratosis.\par \par Verified by: Jg Alicea M.D.\par (Electronic Signature)\par Reported on: 04/25/19 18:04 EDT, [address], [place],\[place] [address]\par _________________________________________________________________\par \par Comment\par This case was reviewed in the department and the diagnosis\par represents a consensus opinion.\par \par Clinical History\par Punch biopsy of left facial skin\par \par Specimen(s) Submitted\par 1     Punch biopsy left facial skin lateral\par 2     Punch biopsy left facial skin\par 3     Punch biopsy left facial skin\par \par \par \par \par \par DANIELLE METCALF                      2\par \par \par \par Surgical Final Report\par \par \par \par \par 4     Punch biopsy left facial skin medial\par \par Gross Description\par 1. The specimen is received in formalin, and labeled "punch\par biopsy of the lateral"\par and consists of a punch biopsy of tan skin measuring 0.1 cm in\par diameter x 0.2 cm in depth.  The specimen is submitted entirely.\par (1 block)\par \par 2. The specimen is received in formalin, and labeled "punch\par biopsy of periphery"\par and consists of a punch biopsy of tan skin measuring 0.1 cm in\par diameter x 0.2 cm in depth.  The specimen is submitted entirely.\par (1 block)\par \par 3. The specimen is received in formalin, and labeled "punch\par biopsy #3" and consists of a punch biopsy of tan skin measuring\par 0.1 cm in diameter x 0.5 cm in depth.  The specimen is submitted\par entirely. (1 block)\par \par 4. The specimen is received in formalin, and labeled "punch\par biopsy from medial"and consists of a punch biopsy of tan skin\par measuring 0.1 cm in diameter x 0.3 cm in depth.  The specimen is\par submitted entirely. 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 Each time treated with RT. \par \par Biopsy on 12.5.18: left preauricular area: limited findings; can not assess the depth; differential includes pseudolymphoma or evolving lymphoproliferative disorder\par \par PMH: follicular lymphoma stage IE\par FH: colon cancer in father and brother; lymphohistocytosis in mother\par SH; no smoking

## 2020-10-29 DIAGNOSIS — C82.90 FOLLICULAR LYMPHOMA, UNSPECIFIED, UNSPECIFIED SITE: ICD-10-CM

## 2021-10-08 ENCOUNTER — OUTPATIENT (OUTPATIENT)
Dept: OUTPATIENT SERVICES | Facility: HOSPITAL | Age: 71
LOS: 1 days | Discharge: HOME | End: 2021-10-08
Payer: MEDICARE

## 2021-10-08 DIAGNOSIS — Z12.31 ENCOUNTER FOR SCREENING MAMMOGRAM FOR MALIGNANT NEOPLASM OF BREAST: ICD-10-CM

## 2021-10-08 PROCEDURE — 77067 SCR MAMMO BI INCL CAD: CPT | Mod: 26

## 2021-10-08 PROCEDURE — 77063 BREAST TOMOSYNTHESIS BI: CPT | Mod: 26

## 2021-10-22 ENCOUNTER — OUTPATIENT (OUTPATIENT)
Dept: OUTPATIENT SERVICES | Facility: HOSPITAL | Age: 71
LOS: 1 days | Discharge: HOME | End: 2021-10-22

## 2021-10-25 DIAGNOSIS — M81.0 AGE-RELATED OSTEOPOROSIS WITHOUT CURRENT PATHOLOGICAL FRACTURE: ICD-10-CM

## 2021-10-25 DIAGNOSIS — Z78.0 ASYMPTOMATIC MENOPAUSAL STATE: ICD-10-CM

## 2021-10-25 DIAGNOSIS — Z13.820 ENCOUNTER FOR SCREENING FOR OSTEOPOROSIS: ICD-10-CM

## 2021-11-01 ENCOUNTER — NON-APPOINTMENT (OUTPATIENT)
Age: 71
End: 2021-11-01

## 2021-11-04 ENCOUNTER — LABORATORY RESULT (OUTPATIENT)
Age: 71
End: 2021-11-04

## 2021-11-04 ENCOUNTER — NON-APPOINTMENT (OUTPATIENT)
Age: 71
End: 2021-11-04

## 2021-11-04 ENCOUNTER — APPOINTMENT (OUTPATIENT)
Dept: OTOLARYNGOLOGY | Facility: CLINIC | Age: 71
End: 2021-11-04
Payer: MEDICARE

## 2021-11-04 ENCOUNTER — APPOINTMENT (OUTPATIENT)
Dept: HEMATOLOGY ONCOLOGY | Facility: CLINIC | Age: 71
End: 2021-11-04

## 2021-11-04 DIAGNOSIS — H90.5 UNSPECIFIED SENSORINEURAL HEARING LOSS: ICD-10-CM

## 2021-11-04 DIAGNOSIS — H91.90 UNSPECIFIED HEARING LOSS, UNSPECIFIED EAR: ICD-10-CM

## 2021-11-04 PROCEDURE — 92557 COMPREHENSIVE HEARING TEST: CPT

## 2021-11-04 PROCEDURE — 99203 OFFICE O/P NEW LOW 30 MIN: CPT | Mod: 25

## 2021-11-04 PROCEDURE — 92550 TYMPANOMETRY & REFLEX THRESH: CPT

## 2021-11-04 NOTE — HISTORY OF PRESENT ILLNESS
[de-identified] : Patient presents today with c/o hearing loss. Gradual hearing loss- worse in the left ear. No tinnitus. No h/o ear infections. No recent audiogram. Hearing loss has gradually diminished.  [Hearing Loss] : hearing loss

## 2021-11-09 ENCOUNTER — APPOINTMENT (OUTPATIENT)
Dept: HEMATOLOGY ONCOLOGY | Facility: CLINIC | Age: 71
End: 2021-11-09
Payer: MEDICARE

## 2021-11-09 ENCOUNTER — LABORATORY RESULT (OUTPATIENT)
Age: 71
End: 2021-11-09

## 2021-11-09 VITALS
TEMPERATURE: 97.2 F | HEART RATE: 85 BPM | DIASTOLIC BLOOD PRESSURE: 80 MMHG | BODY MASS INDEX: 23.74 KG/M2 | HEIGHT: 63 IN | WEIGHT: 134 LBS | SYSTOLIC BLOOD PRESSURE: 135 MMHG

## 2021-11-09 LAB
ALBUMIN SERPL ELPH-MCNC: 4.4 G/DL
ALP BLD-CCNC: 66 U/L
ALT SERPL-CCNC: 19 U/L
ANION GAP SERPL CALC-SCNC: 14 MMOL/L
AST SERPL-CCNC: 16 U/L
BILIRUB DIRECT SERPL-MCNC: <0.2 MG/DL
BILIRUB INDIRECT SERPL-MCNC: >0.1 MG/DL
BILIRUB SERPL-MCNC: 0.3 MG/DL
BUN SERPL-MCNC: 22 MG/DL
CALCIUM SERPL-MCNC: 9.6 MG/DL
CHLORIDE SERPL-SCNC: 103 MMOL/L
CO2 SERPL-SCNC: 25 MMOL/L
CREAT SERPL-MCNC: 0.8 MG/DL
GLUCOSE SERPL-MCNC: 92 MG/DL
HCT VFR BLD CALC: 35.2 %
HCT VFR BLD CALC: 39 %
HGB BLD-MCNC: 11.4 G/DL
HGB BLD-MCNC: 12.9 G/DL
LDH SERPL-CCNC: 179
MCHC RBC-ENTMCNC: 28.4 PG
MCHC RBC-ENTMCNC: 28.9 PG
MCHC RBC-ENTMCNC: 32.4 G/DL
MCHC RBC-ENTMCNC: 33.1 G/DL
MCV RBC AUTO: 87.2 FL
MCV RBC AUTO: 87.6 FL
PLATELET # BLD AUTO: 299 K/UL
PLATELET # BLD AUTO: 304 K/UL
PMV BLD: 10.7 FL
PMV BLD: 10.7 FL
POTASSIUM SERPL-SCNC: 4.4 MMOL/L
PROT SERPL-MCNC: 7.3 G/DL
RBC # BLD: 4.02 M/UL
RBC # BLD: 4.47 M/UL
RBC # FLD: 15.3 %
RBC # FLD: 15.5 %
SODIUM SERPL-SCNC: 142 MMOL/L
WBC # FLD AUTO: 7.76 K/UL
WBC # FLD AUTO: 8.37 K/UL

## 2021-11-09 PROCEDURE — 99214 OFFICE O/P EST MOD 30 MIN: CPT

## 2021-11-09 NOTE — ASSESSMENT
[Palliative] : Goals of care discussed with patient: Palliative [FreeTextEntry1] : 70 F Low grade skin follicular Lymphoma: \par \par 1 repeat biopsy 1.25.19: negative and repeat biopsy 4/25/19 and 4/29/19: negative for lymphoma \par    Repeat biopsy 8/2019 : negative for lymphoma\par 2. f/u Rad Onc appreciated\par 3. repeat pet scan (1/2019) reviewed: no evidence of distant disease\par \par - Follow up with dermatology and repeat biopsy as needed. \par If lymphoma is diagnosed and RT is not an option , we will discuss  chemotherapy for palliative not curative intent (rituxan weely x4).\par \par \par pt is following w/ dermatology : her last bx of left neck is suspicious (no records): Dr Leilani Sneed is rescheduling bx\par \par anemia; will repeat labs\par

## 2021-11-09 NOTE — HISTORY OF PRESENT ILLNESS
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 Each time treated with RT. \par \par Biopsy on 12.5.18: left preauricular area: limited findings; can not assess the depth; differential includes pseudolymphoma or evolving lymphoproliferative disorder\par \par PMH: follicular lymphoma stage IE\par FH: colon cancer in father and brother; lymphohistocytosis in mother\par SH; no smoking [de-identified] : 1.25.19: biopsy: no lymphoma\par \par \par PET/CT 1/30/19  IMPRESSION: Since 9/25/2017: 1. No definite sites of pathologic FDG uptake. 2. New 0.9 cm right anterior lower lobe linear nodular opacity, max 3.0 SUV, by pattern likely represents focal atelectasis. Chest CT in 3 months can be performed to assess for stability. 3. Persistent mild FDG uptake within subcentimeter bilateral hilar lymph nodes; nonspecific. 4. Mild diffuse uptake within the stomach, correlate for gastritis (max 6.3 SUV). \par \par 10/8/2019 : The patient is here for follow up. She had a skin biopsy of left periauricular region in August 2019, which was negative for lymphoma . No RT needed . Since last visit, she has no new complaints, no fever, no weight loss, no palpable masses or lymph nodes. \par \par DANIELLE METCALF                      2\par \par \par \par Addendum Report\par \par \par \par \par Addendum\par Additional levels are examined on part 2 which show scant\par epidermis with solar\par elastosis.\par \par Verified by: Jg Alicea M.D.\par (Electronic Signature)\par Reported on: 04/29/19 15:52 EDT, [address] [facility], [place],\[place] [address]\par _________________________________________________________________\par \par \par Surgical Final Report\par \par \par \par \par Final Diagnosis\par 1. Skin, left facial lateral, biopsy:\par - Actinic keratosis.\par \par 2. Skin, left facial, biopsy (#2):\par - Fibroadipose tissue, insufficient for diagnosis.\par - Additional levels pending.\par \par 3. Skin, left facial, biopsy (#3):\par - Actinic keratosis.\par \par 4. Skin, left facial medial, biopsy:\par - Actinic keratosis.\par \par Verified by: Jg Alicea M.D.\par (Electronic Signature)\par Reported on: 04/25/19 18:04 EDT, [address], [place],\[place] [address]\par _________________________________________________________________\par \par Comment\par This case was reviewed in the department and the diagnosis\par represents a consensus opinion.\par \par Clinical History\par Punch biopsy of left facial skin\par \par Specimen(s) Submitted\par 1     Punch biopsy left facial skin lateral\par 2     Punch biopsy left facial skin\par 3     Punch biopsy left facial skin\par \par \par \par \par \par DANIELLE METCALF                      2\par \par \par \par Surgical Final Report\par \par \par \par \par 4     Punch biopsy left facial skin medial\par \par Gross Description\par 1. The specimen is received in formalin, and labeled "punch\par biopsy of the lateral"\par and consists of a punch biopsy of tan skin measuring 0.1 cm in\par diameter x 0.2 cm in depth.  The specimen is submitted entirely.\par (1 block)\par \par 2. The specimen is received in formalin, and labeled "punch\par biopsy of periphery"\par and consists of a punch biopsy of tan skin measuring 0.1 cm in\par diameter x 0.2 cm in depth.  The specimen is submitted entirely.\par (1 block)\par \par 3. The specimen is received in formalin, and labeled "punch\par biopsy #3" and consists of a punch biopsy of tan skin measuring\par 0.1 cm in diameter x 0.5 cm in depth.  The specimen is submitted\par entirely. (1 block)\par \par 4. The specimen is received in formalin, and labeled "punch\par biopsy from medial"and consists of a punch biopsy of tan skin\par measuring 0.1 cm in diameter x 0.3 cm in depth.  The specimen is\par submitted entirely. (1 block)\par \par 11/9/21\par

## 2021-11-09 NOTE — PHYSICAL EXAM
[Fully active, able to carry on all pre-disease performance without restriction] : Status 0 - Fully active, able to carry on all pre-disease performance without restriction [Normal] : affect appropriate [de-identified] : left pre auricular site : 2 logan of redness , no palpable raised masses or lesions

## 2021-11-09 NOTE — CONSULT LETTER
[Dear  ___] : Dear  [unfilled], [Consult Letter:] : I had the pleasure of evaluating your patient, [unfilled]. [Please see my note below.] : Please see my note below. [Sincerely,] : Sincerely, [FreeTextEntry2] : Marina Goldberg ,MD [FreeTextEntry3] : Alan Lyons DO\par Attending Physician,\par Hematology/ Medical Oncology\par [phone] office\par \par

## 2021-11-10 LAB
ALBUMIN SERPL ELPH-MCNC: 4.3 G/DL
ALP BLD-CCNC: 59 U/L
ALT SERPL-CCNC: 14 U/L
ANION GAP SERPL CALC-SCNC: 19 MMOL/L
AST SERPL-CCNC: 17 U/L
BILIRUB DIRECT SERPL-MCNC: <0.2 MG/DL
BILIRUB INDIRECT SERPL-MCNC: NORMAL MG/DL
BILIRUB SERPL-MCNC: <0.2 MG/DL
BUN SERPL-MCNC: 23 MG/DL
CALCIUM SERPL-MCNC: 9.8 MG/DL
CHLORIDE SERPL-SCNC: 107 MMOL/L
CO2 SERPL-SCNC: 19 MMOL/L
CREAT SERPL-MCNC: 0.9 MG/DL
GLUCOSE SERPL-MCNC: 98 MG/DL
LDH SERPL-CCNC: 215
POTASSIUM SERPL-SCNC: 5 MMOL/L
PROT SERPL-MCNC: 6.8 G/DL
SODIUM SERPL-SCNC: 145 MMOL/L

## 2021-11-30 ENCOUNTER — APPOINTMENT (OUTPATIENT)
Dept: HEMATOLOGY ONCOLOGY | Facility: CLINIC | Age: 71
End: 2021-11-30
Payer: MEDICARE

## 2021-11-30 ENCOUNTER — OUTPATIENT (OUTPATIENT)
Dept: OUTPATIENT SERVICES | Facility: HOSPITAL | Age: 71
LOS: 1 days | Discharge: HOME | End: 2021-11-30

## 2021-11-30 VITALS
DIASTOLIC BLOOD PRESSURE: 69 MMHG | OXYGEN SATURATION: 98 % | BODY MASS INDEX: 23.92 KG/M2 | SYSTOLIC BLOOD PRESSURE: 108 MMHG | TEMPERATURE: 97.6 F | WEIGHT: 135 LBS | HEIGHT: 63 IN | HEART RATE: 100 BPM

## 2021-11-30 DIAGNOSIS — C82.90 FOLLICULAR LYMPHOMA, UNSPECIFIED, UNSPECIFIED SITE: ICD-10-CM

## 2021-11-30 PROCEDURE — 99215 OFFICE O/P EST HI 40 MIN: CPT

## 2021-11-30 NOTE — ASSESSMENT
[Palliative] : Goals of care discussed with patient: Palliative [FreeTextEntry1] : 70yo F Low grade skin follicular Lymphoma: \par - repeat biopsy 1.25.19: negative and repeat biopsy 4/25/19 and 4/29/19: negative for lymphoma \par - Repeat biopsy 8/2019 : negative for lymphoma\par - PET/CT from 1/2019 reviewed: no evidence of distant disease\par - Follow up with dermatology, Dr. 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 Guerrero, for consultation \par - If lymphoma is diagnosed and RT is not an option , we will discuss therapy for palliative not curative intent (rituxan weekly x4).\par - will request biopsy slides for review by Hematopathology from [facility] at patient request\par \par # Anemia, normocytic\par - CBC, ESR, CRP, LDH, uric acid, phos, iron studies, ferritin, Hep B/C testing today\par \par RTC in 1 month with CBC, BMP, LFTs, LDH\par \par seen/examined w/ [facility]\par 1. need to obtain slides and have them reviewed by [facility] HEMATOPATHOLOGY to confirm the dx\par 2. pet scan to restage\par 3. rad onc eval\par f/u in 3 weeks

## 2021-11-30 NOTE — REVIEW OF SYSTEMS
[Fatigue] : fatigue [Negative] : Allergic/Immunologic [Skin Rash] : skin rash [Fever] : no fever [Chills] : no chills [Night Sweats] : no night sweats [Recent Change In Weight] : ~T no recent weight change [de-identified] : reports L neck localized rash

## 2021-11-30 NOTE — PHYSICAL EXAM
[Fully active, able to carry on all pre-disease performance without restriction] : Status 0 - Fully active, able to carry on all pre-disease performance without restriction [Normal] : affect appropriate [de-identified] : left pre auricular + neck site : 2 areas of redness , no palpable raised masses or lesions

## 2021-11-30 NOTE — HISTORY OF PRESENT ILLNESS
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 Each time treated with RT. \par \par Biopsy on 12.5.18: left preauricular area: limited findings; can not assess the depth; differential includes pseudolymphoma or evolving lymphoproliferative disorder\par \par PMH: follicular lymphoma stage IE\par FH: colon cancer in father and brother; lymphohistocytosis in mother\par SH; no smoking [de-identified] : 1.25.19: biopsy: no lymphoma\par \par \par PET/CT 1/30/19  IMPRESSION: Since 9/25/2017: 1. No definite sites of pathologic FDG uptake. 2. New 0.9 cm right anterior lower lobe linear nodular opacity, max 3.0 SUV, by pattern likely represents focal atelectasis. Chest CT in 3 months can be performed to assess for stability. 3. Persistent mild FDG uptake within subcentimeter bilateral hilar lymph nodes; nonspecific. 4. Mild diffuse uptake within the stomach, correlate for gastritis (max 6.3 SUV). \par \par 10/8/2019 : The patient is here for follow up. She had a skin biopsy of left periauricular region in August 2019, which was negative for lymphoma . No RT needed . Since last visit, she has no new complaints, no fever, no weight loss, no palpable masses or lymph nodes. \par \par DANIELLE METCALF                      2\par \par \par \par Addendum Report\par \par \par \par \par Addendum\par Additional levels are examined on part 2 which show scant\par epidermis with solar\par elastosis.\par \par Verified by: Jg Alicea M.D.\par (Electronic Signature)\par Reported on: 04/29/19 15:52 EDT, [address] [facility], [place],\[place] [address]\par _________________________________________________________________\par \par \par Surgical Final Report\par \par \par \par \par Final Diagnosis\par 1. Skin, left facial lateral, biopsy:\par - Actinic keratosis.\par \par 2. Skin, left facial, biopsy (#2):\par - Fibroadipose tissue, insufficient for diagnosis.\par - Additional levels pending.\par \par 3. Skin, left facial, biopsy (#3):\par - Actinic keratosis.\par \par 4. Skin, left facial medial, biopsy:\par - Actinic keratosis.\par \par Verified by: Jg Alicea M.D.\par (Electronic Signature)\par Reported on: 04/25/19 18:04 EDT, [address], [place],\[place] [address]\par _________________________________________________________________\par \par Comment\par This case was reviewed in the department and the diagnosis\par represents a consensus opinion.\par \par Clinical History\par Punch biopsy of left facial skin\par \par Specimen(s) Submitted\par 1     Punch biopsy left facial skin lateral\par 2     Punch biopsy left facial skin\par 3     Punch biopsy left facial skin\par \par \par \par \par \par DANIELLE METCALF                      2\par \par \par \par Surgical Final Report\par \par \par \par \par 4     Punch biopsy left facial skin medial\par \par Gross Description\par 1. The specimen is received in formalin, and labeled "punch\par biopsy of the lateral"\par and consists of a punch biopsy of tan skin measuring 0.1 cm in\par diameter x 0.2 cm in depth.  The specimen is submitted entirely.\par (1 block)\par \par 2. The specimen is received in formalin, and labeled "punch\par biopsy of periphery"\par and consists of a punch biopsy of tan skin measuring 0.1 cm in\par diameter x 0.2 cm in depth.  The specimen is submitted entirely.\par (1 block)\par \par 3. The specimen is received in formalin, and labeled "punch\par biopsy #3" and consists of a punch biopsy of tan skin measuring\par 0.1 cm in diameter x 0.5 cm in depth.  The specimen is submitted\par entirely. (1 block)\par \par 4. The specimen is received in formalin, and labeled "punch\par biopsy from medial"and consists of a punch biopsy of tan skin\par measuring 0.1 cm in diameter x 0.3 cm in depth.  The specimen is\par submitted entirely. (1 block)\par \par 11/30/21\par Patient is here for a follow-up visit, accompanied by daughter in law.  Patient denies fever, chills, night sweats, unintentional weight loss or bleeding.  She does report weakness and occasional dizziness, worse over the last 6 months or so.  She also reports hearing issues over the last year.  She underwent biopsy with DERM about 2 weeks ago.  Reviewed pathology results which are non-specific.  \par PATH (11.10.2021 - DERMPATH DIAGNOSTICS) INTERPRETATION:  PCR analysis of the immunoglobulin heavy and kappa chain (IGH, IGK) gene rearrangements was performed by multiplex PCR followed by capillary electrophoresis.  A positive result indicates the presence of a clonal B-cell expansion, except with immature lymphoid neoplasms where these rearrangement can be seen in other lineages.  The diagnostic sensitivity of this assay depends on the number of background B-cells in the samples, varyingfrom 1-10% clonal B-cells in a polyclonal B-cell background.  The reference range for B-cell gene rearrangments in non0-neoplastic tissues is negative.

## 2021-11-30 NOTE — CONSULT LETTER
[Dear  ___] : Dear  [unfilled], [Consult Letter:] : I had the pleasure of evaluating your patient, [unfilled]. [Please see my note below.] : Please see my note below. [Sincerely,] : Sincerely, [FreeTextEntry3] : Alan Lyons DO\par Attending Physician,\par Hematology/ Medical Oncology\par [phone] office\par \par

## 2021-12-01 LAB
CRP SERPL-MCNC: 3 MG/L
ERYTHROCYTE [SEDIMENTATION RATE] IN BLOOD BY WESTERGREN METHOD: 32 MM/HR
FERRITIN SERPL-MCNC: 70 NG/ML
HBV CORE IGG+IGM SER QL: NONREACTIVE
HBV CORE IGM SER QL: NONREACTIVE
HBV SURFACE AB SER QL: NONREACTIVE
IRON SATN MFR SERPL: 17 %
IRON SERPL-MCNC: 53 UG/DL
LDH SERPL-CCNC: 201
PHOSPHATE SERPL-MCNC: 3.9 MG/DL
TIBC SERPL-MCNC: 303 UG/DL
UIBC SERPL-MCNC: 250 UG/DL
URATE SERPL-MCNC: 5.1 MG/DL

## 2021-12-03 LAB
HCV RNA SERPL NAA+PROBE-LOG IU: NOT DETECTED LOGIU/ML
HEPC RNA INTERP: NOT DETECTED IU/ML

## 2021-12-09 ENCOUNTER — RESULT REVIEW (OUTPATIENT)
Age: 71
End: 2021-12-09

## 2021-12-09 ENCOUNTER — OUTPATIENT (OUTPATIENT)
Dept: OUTPATIENT SERVICES | Facility: HOSPITAL | Age: 71
LOS: 1 days | Discharge: HOME | End: 2021-12-09
Payer: MEDICARE

## 2021-12-09 DIAGNOSIS — C82.90 FOLLICULAR LYMPHOMA, UNSPECIFIED, UNSPECIFIED SITE: ICD-10-CM

## 2021-12-09 DIAGNOSIS — C82.01 FOLLICULAR LYMPHOMA GRADE I, LYMPH NODES OF HEAD, FACE, AND NECK: ICD-10-CM

## 2021-12-09 LAB — GLUCOSE BLDC GLUCOMTR-MCNC: 80 MG/DL — SIGNIFICANT CHANGE UP (ref 70–99)

## 2021-12-09 PROCEDURE — 78815 PET IMAGE W/CT SKULL-THIGH: CPT | Mod: 26,PS,MH

## 2021-12-14 ENCOUNTER — TRANSCRIPTION ENCOUNTER (OUTPATIENT)
Age: 71
End: 2021-12-14

## 2021-12-16 ENCOUNTER — APPOINTMENT (OUTPATIENT)
Dept: HEMATOLOGY ONCOLOGY | Facility: CLINIC | Age: 71
End: 2021-12-16
Payer: MEDICARE

## 2021-12-16 ENCOUNTER — APPOINTMENT (OUTPATIENT)
Dept: RADIATION ONCOLOGY | Facility: HOSPITAL | Age: 71
End: 2021-12-16

## 2021-12-16 ENCOUNTER — OUTPATIENT (OUTPATIENT)
Dept: OUTPATIENT SERVICES | Facility: HOSPITAL | Age: 71
LOS: 1 days | Discharge: HOME | End: 2021-12-16

## 2021-12-16 PROCEDURE — 99214 OFFICE O/P EST MOD 30 MIN: CPT | Mod: 95

## 2021-12-16 NOTE — CONSULT LETTER
[Dear  ___] : Dear  [unfilled], [Consult Letter:] : I had the pleasure of evaluating your patient, [unfilled]. [Please see my note below.] : Please see my note below. [Sincerely,] : Sincerely, [FreeTextEntry3] : Alan Lyons DO\par Attending Physician,\par Hematology/ Medical Oncology\par [phone] office\par \par

## 2021-12-16 NOTE — REASON FOR VISIT
[Follow-Up Visit] : a follow-up [Other: _____] : [unfilled] [Home] : at home, [unfilled] , at the time of the visit. [Medical Office: ([facility])___] : at the medical office located in  [Verbal consent obtained from patient] : the patient, [unfilled] [TWNoteComboBox1] : [place]

## 2021-12-16 NOTE — PHYSICAL EXAM
[Fully active, able to carry on all pre-disease performance without restriction] : Status 0 - Fully active, able to carry on all pre-disease performance without restriction [Normal] : affect appropriate [de-identified] : left pre auricular + neck site : 2 areas of redness , no palpable raised masses or lesions

## 2021-12-16 NOTE — ASSESSMENT
[Palliative] : Goals of care discussed with patient: Palliative [FreeTextEntry1] : 72yo F Low grade skin follicular Lymphoma: \par - repeat biopsy 1.25.19: negative and repeat biopsy 4/25/19 and 4/29/19: negative for lymphoma \par - Repeat biopsy 8/2019 : negative for lymphoma\par - PET/CT from 1/2019 reviewed: no evidence of distant disease\par - Follow up with dermatology, Dr. 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 Guerrero, for consultation \par - If lymphoma is diagnosed and RT is not an option , we will discuss therapy for palliative not curative intent (rituxan weekly x4).\par - will request biopsy slides for review by Hematopathology from [facility] at patient request\par \par # Anemia, normocytic\par - CBC, ESR, CRP, LDH, uric acid, phos, iron studies, ferritin, Hep B/C testing today\par \par - pet scan reviewed; there are 2 areas of increased FDG uptake: one in stomach and another in lungs, which have been present since at least 2017 and are stable with the same FDG uptake: discussed w/ pt; will refer to GI\par \par - SHE has an apt w/ Dr Lopez re: skin lesions; i discussed w/ Dr Mauri Parks from hematopathology the impression of the latest bx (slides were not received); agreed that the imprssion indicates similar pathology as was present previously; pt would like to proceed w/ RT\par - AFTER she completes RT, will refer to pulmonary to evaluate the lesions; as per pt, she had them for many years

## 2021-12-16 NOTE — REVIEW OF SYSTEMS
[Fatigue] : fatigue [Skin Rash] : skin rash [Negative] : Allergic/Immunologic [Fever] : no fever [Chills] : no chills [Night Sweats] : no night sweats [Recent Change In Weight] : ~T no recent weight change [de-identified] : reports L neck localized rash

## 2021-12-17 ENCOUNTER — APPOINTMENT (OUTPATIENT)
Dept: RADIATION ONCOLOGY | Facility: HOSPITAL | Age: 71
End: 2021-12-17
Payer: MEDICARE

## 2021-12-17 ENCOUNTER — LABORATORY RESULT (OUTPATIENT)
Age: 71
End: 2021-12-17

## 2021-12-17 VITALS
SYSTOLIC BLOOD PRESSURE: 105 MMHG | OXYGEN SATURATION: 98 % | DIASTOLIC BLOOD PRESSURE: 64 MMHG | HEART RATE: 91 BPM | WEIGHT: 133.2 LBS | RESPIRATION RATE: 14 BRPM | TEMPERATURE: 96.4 F | BODY MASS INDEX: 23.6 KG/M2 | HEIGHT: 63 IN

## 2021-12-17 PROCEDURE — 99213 OFFICE O/P EST LOW 20 MIN: CPT

## 2021-12-28 PROCEDURE — 77263 THER RADIOLOGY TX PLNG CPLX: CPT

## 2021-12-28 PROCEDURE — 77290 THER RAD SIMULAJ FIELD CPLX: CPT | Mod: 26

## 2021-12-28 PROCEDURE — 77334 RADIATION TREATMENT AID(S): CPT | Mod: 26

## 2021-12-29 ENCOUNTER — APPOINTMENT (OUTPATIENT)
Dept: HEMATOLOGY ONCOLOGY | Facility: CLINIC | Age: 71
End: 2021-12-29

## 2021-12-30 PROCEDURE — 77306 TELETHX ISODOSE PLAN SIMPLE: CPT | Mod: 26

## 2021-12-30 PROCEDURE — 77332 RADIATION TREATMENT AID(S): CPT | Mod: 26

## 2022-01-11 ENCOUNTER — NON-APPOINTMENT (OUTPATIENT)
Age: 72
End: 2022-01-11

## 2022-01-11 VITALS
WEIGHT: 133 LBS | BODY MASS INDEX: 23.56 KG/M2 | DIASTOLIC BLOOD PRESSURE: 62 MMHG | OXYGEN SATURATION: 98 % | RESPIRATION RATE: 16 BRPM | SYSTOLIC BLOOD PRESSURE: 136 MMHG | TEMPERATURE: 96.4 F | HEART RATE: 101 BPM

## 2022-01-11 PROCEDURE — 77427 RADIATION TX MANAGEMENT X5: CPT

## 2022-01-11 NOTE — DISEASE MANAGEMENT
[Clinical] : TNM Stage: c [I] : I [TTNM] : . [NTNM] : . [MTNM] : . [de-identified] : 200cGy [de-identified] : 2400cgy [de-identified] : Left neck

## 2022-01-11 NOTE — HISTORY OF PRESENT ILLNESS
[FreeTextEntry1] : 71 year old known to us, with a low grade lymphoma (MZL)involving the skin.   She has been treated to the left cheek x 2.  An area on the left eyebrow spontaneously resolved.\par \par She now has an area involving the left neck just below the mandible.  Biopsy on 11/10/2021 is consistent with a B-cell lymphoma.  PET scan was notable for "non-specific" inflammatory changes (low level SUV's).   She is here to discuss palliative radiation to the neck.

## 2022-01-11 NOTE — PHYSICAL EXAM
[Normal] : supple with no thyromegaly or masses appreciated [de-identified] : BMI is 24 [de-identified] : no treatment related changes noted.

## 2022-01-18 ENCOUNTER — NON-APPOINTMENT (OUTPATIENT)
Age: 72
End: 2022-01-18

## 2022-01-18 VITALS
RESPIRATION RATE: 16 BRPM | TEMPERATURE: 97 F | OXYGEN SATURATION: 100 % | HEART RATE: 91 BPM | BODY MASS INDEX: 23.56 KG/M2 | DIASTOLIC BLOOD PRESSURE: 68 MMHG | WEIGHT: 133 LBS | SYSTOLIC BLOOD PRESSURE: 138 MMHG

## 2022-01-18 NOTE — PHYSICAL EXAM
[Normal] : supple with no thyromegaly or masses appreciated [de-identified] : BMI is 24 [de-identified] : The "rash" on her neck is more defined.  no treatment related changes noted.

## 2022-01-18 NOTE — DISEASE MANAGEMENT
[Clinical] : TNM Stage: c [I] : I [TTNM] : . [NTNM] : . [MTNM] : . [de-identified] : 1000cGy [de-identified] : 2400cgy [de-identified] : Left neck

## 2022-01-19 PROCEDURE — 77427 RADIATION TX MANAGEMENT X5: CPT

## 2022-01-20 ENCOUNTER — NON-APPOINTMENT (OUTPATIENT)
Age: 72
End: 2022-01-20

## 2022-01-25 ENCOUNTER — NON-APPOINTMENT (OUTPATIENT)
Age: 72
End: 2022-01-25

## 2022-01-25 VITALS
RESPIRATION RATE: 16 BRPM | BODY MASS INDEX: 23.74 KG/M2 | TEMPERATURE: 96.8 F | DIASTOLIC BLOOD PRESSURE: 55 MMHG | OXYGEN SATURATION: 100 % | WEIGHT: 134 LBS | HEART RATE: 99 BPM | SYSTOLIC BLOOD PRESSURE: 106 MMHG

## 2022-01-25 NOTE — DISEASE MANAGEMENT
[Clinical] : TNM Stage: c [I] : I [TTNM] : . [NTNM] : . [MTNM] : . [de-identified] : 1800cGy [de-identified] : 2400cgy [de-identified] : Left neck

## 2022-01-25 NOTE — PHYSICAL EXAM
[Normal] : supple with no thyromegaly or masses appreciated [de-identified] : BMI is 24 [de-identified] : Faint erythema in the field, however the "rash" has significantly flattened.

## 2022-01-28 ENCOUNTER — OUTPATIENT (OUTPATIENT)
Dept: OUTPATIENT SERVICES | Facility: HOSPITAL | Age: 72
LOS: 1 days | Discharge: HOME | End: 2022-01-28
Payer: MEDICARE

## 2022-01-28 DIAGNOSIS — C85.80 OTHER SPECIFIED TYPES OF NON-HODGKIN LYMPHOMA, UNSPECIFIED SITE: ICD-10-CM

## 2022-02-01 NOTE — PHYSICAL EXAM
[Normal] : supple with no thyromegaly or masses appreciated [de-identified] : BMI is 24 [de-identified] : on the left neck are scattered non-raised, macular areas of eythema c/w with a low grade lymphoma.   there are no palpable nodes.

## 2022-03-01 ENCOUNTER — OUTPATIENT (OUTPATIENT)
Dept: OUTPATIENT SERVICES | Facility: HOSPITAL | Age: 72
LOS: 1 days | Discharge: HOME | End: 2022-03-01
Payer: MEDICARE

## 2022-03-01 ENCOUNTER — APPOINTMENT (OUTPATIENT)
Dept: RADIATION ONCOLOGY | Facility: HOSPITAL | Age: 72
End: 2022-03-01

## 2022-03-01 DIAGNOSIS — N63.20 UNSPECIFIED LUMP IN THE LEFT BREAST, UNSPECIFIED QUADRANT: ICD-10-CM

## 2022-03-01 PROCEDURE — 76642 ULTRASOUND BREAST LIMITED: CPT | Mod: 26,LT

## 2022-03-01 PROCEDURE — 77065 DX MAMMO INCL CAD UNI: CPT | Mod: 26,LT

## 2022-03-01 PROCEDURE — G0279: CPT | Mod: 26

## 2022-03-03 DIAGNOSIS — C82.90 FOLLICULAR LYMPHOMA, UNSPECIFIED, UNSPECIFIED SITE: ICD-10-CM

## 2022-03-04 ENCOUNTER — APPOINTMENT (OUTPATIENT)
Dept: RADIATION ONCOLOGY | Facility: HOSPITAL | Age: 72
End: 2022-03-04
Payer: MEDICARE

## 2022-03-04 VITALS
TEMPERATURE: 97.3 F | WEIGHT: 135.9 LBS | DIASTOLIC BLOOD PRESSURE: 66 MMHG | RESPIRATION RATE: 12 BRPM | HEART RATE: 96 BPM | BODY MASS INDEX: 24.07 KG/M2 | OXYGEN SATURATION: 98 % | SYSTOLIC BLOOD PRESSURE: 105 MMHG

## 2022-03-04 PROCEDURE — 99024 POSTOP FOLLOW-UP VISIT: CPT

## 2022-03-04 RX ORDER — BETAMETHASONE DIPROPIONATE 0.5 MG/G
0.05 CREAM, AUGMENTED TOPICAL
Refills: 0 | Status: DISCONTINUED | COMMUNITY
End: 2022-03-04

## 2022-03-04 NOTE — DISEASE MANAGEMENT
[Clinical] : TNM Stage: c [I] : I [TTNM] : . [NTNM] : . [MTNM] : . [de-identified] : 1800cGy [de-identified] : 2400cgy [de-identified] : Left neck

## 2022-03-04 NOTE — PHYSICAL EXAM
[Normal] : supple with no thyromegaly or masses appreciated [de-identified] : BMI is 24 [de-identified] : as shown in the photos, complete response and complete recovery at this point.

## 2022-03-04 NOTE — HISTORY OF PRESENT ILLNESS
[FreeTextEntry1] : 1/25/2022 OTV 1800cGy/2400cGy to the left neck: Pt denies any pain. Denies any throat issues or N/V. Pt applying Aquaphor daily. \par \par 1/18/2022 OTV 1000cGy/2400cGy to the left neck: Pt denies any pain. C/o nausea. Skin care discussed. Moisturizing daily.  To date no throat issues. \par \par 1/11/2022 OTV 200cGy/2400cGy to the left neck: Pt received first radiation treatment today. Denies any pain. Pt states she has received radiation multiple times. Denies any questions or concerns at this time. Skin care discussed. \par \par The set-up was verified on the machine and approved for treatment.

## 2022-03-15 DIAGNOSIS — N63.21 UNSPECIFIED LUMP IN THE LEFT BREAST, UPPER OUTER QUADRANT: ICD-10-CM

## 2022-04-05 ENCOUNTER — LABORATORY RESULT (OUTPATIENT)
Age: 72
End: 2022-04-05

## 2022-04-05 ENCOUNTER — OUTPATIENT (OUTPATIENT)
Dept: OUTPATIENT SERVICES | Facility: HOSPITAL | Age: 72
LOS: 1 days | Discharge: HOME | End: 2022-04-05

## 2022-04-05 ENCOUNTER — APPOINTMENT (OUTPATIENT)
Dept: HEMATOLOGY ONCOLOGY | Facility: CLINIC | Age: 72
End: 2022-04-05
Payer: MEDICARE

## 2022-04-05 VITALS
RESPIRATION RATE: 16 BRPM | HEART RATE: 89 BPM | BODY MASS INDEX: 23.92 KG/M2 | WEIGHT: 135 LBS | SYSTOLIC BLOOD PRESSURE: 137 MMHG | DIASTOLIC BLOOD PRESSURE: 67 MMHG | HEIGHT: 63 IN | TEMPERATURE: 97.8 F

## 2022-04-05 LAB
ALBUMIN SERPL ELPH-MCNC: 4.7 G/DL
ALP BLD-CCNC: 67 U/L
ALT SERPL-CCNC: 23 U/L
ANION GAP SERPL CALC-SCNC: 18 MMOL/L
AST SERPL-CCNC: 19 U/L
BILIRUB DIRECT SERPL-MCNC: <0.2 MG/DL
BILIRUB INDIRECT SERPL-MCNC: NORMAL MG/DL
BILIRUB SERPL-MCNC: <0.2 MG/DL
BUN SERPL-MCNC: 23 MG/DL
CALCIUM SERPL-MCNC: 10.3 MG/DL
CHLORIDE SERPL-SCNC: 105 MMOL/L
CO2 SERPL-SCNC: 23 MMOL/L
CREAT SERPL-MCNC: 0.8 MG/DL
EGFR: 78 ML/MIN/1.73M2
GLUCOSE SERPL-MCNC: 86 MG/DL
HCT VFR BLD CALC: 40.3 %
HGB BLD-MCNC: 12.8 G/DL
MCHC RBC-ENTMCNC: 27.6 PG
MCHC RBC-ENTMCNC: 31.8 G/DL
MCV RBC AUTO: 87 FL
PLATELET # BLD AUTO: 329 K/UL
PMV BLD: 10.4 FL
POTASSIUM SERPL-SCNC: 4.4 MMOL/L
PROT SERPL-MCNC: 7.5 G/DL
RBC # BLD: 4.63 M/UL
RBC # FLD: 15.1 %
SODIUM SERPL-SCNC: 146 MMOL/L
WBC # FLD AUTO: 9.61 K/UL

## 2022-04-05 PROCEDURE — 99214 OFFICE O/P EST MOD 30 MIN: CPT

## 2022-04-05 NOTE — REVIEW OF SYSTEMS
[Fatigue] : fatigue [Skin Rash] : skin rash [Negative] : Allergic/Immunologic [Fever] : no fever [Chills] : no chills [Night Sweats] : no night sweats [Recent Change In Weight] : ~T no recent weight change [de-identified] : reports L neck localized rash

## 2022-04-05 NOTE — ASSESSMENT
[Palliative] : Goals of care discussed with patient: Palliative [FreeTextEntry1] : # skin follicular Lymphoma , Low grade \par - repeat biopsy 1.25.19: negative and repeat biopsy 4/25/19 and 4/29/19: negative for lymphoma \par - Repeat biopsy 8/2019 : negative for lymphoma\par - PET/CT from 1/2019 reviewed: no evidence of distant disease\par - s/p biopsy of L neck with DERM in 11/2021, suggestive of presence of clonal B-cell expansion which we explained was non-specific\par - If lymphoma is diagnosed and RT is not an option , we will discuss therapy for palliative not curative intent (rituxan weekly x4).\par - Follow up with dermatology, Dr. Leilani Sneed, as indicated\par - follows with Dr. Lopez re: skin lesions\par - discussed w/ Dr Mauri Parks from hematopathology the impression of the latest bx (slides were not received); agreed that the impression indicates similar pathology as was present previously\par - s/p RT to left neck from 1/11/22 - 1/28/22 with Dr. Guerrero\par - she will discuss with PCP regarding referral to pulmonary to evaluate the lesions; as per pt, she had them for many years\par \par # Anemia, normocytic\par - pet scan reviewed; there are 2 areas of increased FDG uptake: one in stomach and another in lungs, which have been present since at least 2017 and are stable with the same FDG uptake: discussed w/ pt\par - referred to GI, Dr. 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

## 2022-04-05 NOTE — HISTORY OF PRESENT ILLNESS
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 Each time treated with RT. \par \par Biopsy on 12.5.18: left preauricular area: limited findings; can not assess the depth; differential includes pseudolymphoma or evolving lymphoproliferative disorder\par \par PMH: follicular lymphoma stage IE\par FH: colon cancer in father and brother; lymphohistocytosis in mother\par SH; no smoking [de-identified] : 1.25.19: biopsy: no lymphoma\par \par \par PET/CT 1/30/19  IMPRESSION: Since 9/25/2017: 1. No definite sites of pathologic FDG uptake. 2. New 0.9 cm right anterior lower lobe linear nodular opacity, max 3.0 SUV, by pattern likely represents focal atelectasis. Chest CT in 3 months can be performed to assess for stability. 3. Persistent mild FDG uptake within subcentimeter bilateral hilar lymph nodes; nonspecific. 4. Mild diffuse uptake within the stomach, correlate for gastritis (max 6.3 SUV). \par \par 10/8/2019 : The patient is here for follow up. She had a skin biopsy of left periauricular region in August 2019, which was negative for lymphoma . No RT needed . Since last visit, she has no new complaints, no fever, no weight loss, no palpable masses or lymph nodes. \par \par DANIELLE METCALF                      2\par \par \par \par Addendum Report\par \par \par \par \par Addendum\par Additional levels are examined on part 2 which show scant\par epidermis with solar\par elastosis.\par \par Verified by: Jg Alicea M.D.\par (Electronic Signature)\par Reported on: 04/29/19 15:52 EDT, [facility] [place] [address]\par _________________________________________________________________\par \par \par Surgical Final Report\par \par \par \par \par Final Diagnosis\par 1. Skin, left facial lateral, biopsy:\par - Actinic keratosis.\par \par 2. Skin, left facial, biopsy (#2):\par - Fibroadipose tissue, insufficient for diagnosis.\par - Additional levels pending.\par \par 3. Skin, left facial, biopsy (#3):\par - Actinic keratosis.\par \par 4. Skin, left facial medial, biopsy:\par - Actinic keratosis.\par \par Verified by: Jg Alicea M.D.\par (Electronic Signature)\par Reported on: 04/25/19 18:04 EDT, [address], [place] [address]\par _________________________________________________________________\par \par Comment\par This case was reviewed in the department and the diagnosis\par represents a consensus opinion.\par \par Clinical History\par Punch biopsy of left facial skin\par \par Specimen(s) Submitted\par 1     Punch biopsy left facial skin lateral\par 2     Punch biopsy left facial skin\par 3     Punch biopsy left facial skin\par \par \par \par \par \par DANIELLE METCALF                      2\par \par \par \par Surgical Final Report\par \par \par \par \par 4     Punch biopsy left facial skin medial\par \par Gross Description\par 1. The specimen is received in formalin, and labeled "punch\par biopsy of the lateral"\par and consists of a punch biopsy of tan skin measuring 0.1 cm in\par diameter x 0.2 cm in depth.  The specimen is submitted entirely.\par (1 block)\par \par 2. The specimen is received in formalin, and labeled "punch\par biopsy of periphery"\par and consists of a punch biopsy of tan skin measuring 0.1 cm in\par diameter x 0.2 cm in depth.  The specimen is submitted entirely.\par (1 block)\par \par 3. The specimen is received in formalin, and labeled "punch\par biopsy #3" and consists of a punch biopsy of tan skin measuring\par 0.1 cm in diameter x 0.5 cm in depth.  The specimen is submitted\par entirely. (1 block)\par \par 4. The specimen is received in formalin, and labeled "punch\par biopsy from medial"and consists of a punch biopsy of tan skin\par measuring 0.1 cm in diameter x 0.3 cm in depth.  The specimen is\par submitted entirely. (1 block)\par \par 11/30/21\par Patient is here for a follow-up visit, accompanied by daughter in law.  Patient denies fever, chills, night sweats, unintentional weight loss or bleeding.  She does report weakness and occasional dizziness, worse over the last 6 months or so.  She also reports hearing issues over the last year.  She underwent biopsy with DERM about 2 weeks ago.  Reviewed pathology results which are non-specific.  \par PATH (11.10.2021 - [facility]ER[facility]) INTERPRETATION:  PCR analysis of the immunoglobulin heavy and kappa chain (IGH, IGK) gene rearrangements was performed by multiplex PCR followed by capillary electrophoresis.  A positive result indicates the presence of a clonal B-cell expansion, except with immature lymphoid neoplasms where these rearrangement can be seen in other lineages.  The diagnostic sensitivity of this assay depends on the number of background B-cells in the samples, varyingfrom 1-10% clonal B-cells in a polyclonal B-cell background.  The reference range for B-cell gene rearrangments in non0-neoplastic tissues is negative.  \par \par PET/CT 12/2021\par Since January 30, 2019, persistent mild FDG uptake in bilateral hilar \par lymph nodes, nonspecific (max SUV 6 in a right hilar node, 20% increase).\par \par 2.  Persistent mild diffuse FDG uptake in the stomach, nonspecific and \par possibly inflammatory (max SUV 6.6). Further evaluation with an endoscopy \par may be warranted, if not yet performed\par \par 4/5/22\par Patient is here for a follow-up visit for follicular lymphoma.  Since last visit, she completed RT to left neck from 1/11/22 - 1/28/22 with Dr. Guerrero.  She reports fatigue from RT.  Patient denies fever, chills, night sweats, unintentional weight loss or bleeding.

## 2022-04-05 NOTE — CONSULT LETTER
[Dear  ___] : Dear  [unfilled], [Consult Letter:] : I had the pleasure of evaluating your patient, [unfilled]. [Please see my note below.] : Please see my note below. [Sincerely,] : Sincerely, [FreeTextEntry3] : Alan Lyons DO\par Attending Physician,\par Hematology/ Medical Oncology\par [phone] office\par \par

## 2022-04-05 NOTE — PHYSICAL EXAM
[Fully active, able to carry on all pre-disease performance without restriction] : Status 0 - Fully active, able to carry on all pre-disease performance without restriction [Normal] : affect appropriate [de-identified] : left pre auricular + neck site : 2 areas of redness , no palpable raised masses or lesions

## 2022-04-06 DIAGNOSIS — C82.90 FOLLICULAR LYMPHOMA, UNSPECIFIED, UNSPECIFIED SITE: ICD-10-CM

## 2022-04-06 NOTE — HISTORY OF PRESENT ILLNESS
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 Each time treated with RT. \par \par Biopsy on 12.5.18: left preauricular area: limited findings; can not assess the depth; differential includes pseudolymphoma or evolving lymphoproliferative disorder\par \par PMH: follicular lymphoma stage IE\par FH: colon cancer in father and brother; lymphohistocytosis in mother\par SH; no smoking [de-identified] : 1.25.19: biopsy: no lymphoma\par \par \par PET/CT 1/30/19  IMPRESSION: Since 9/25/2017: 1. No definite sites of pathologic FDG uptake. 2. New 0.9 cm right anterior lower lobe linear nodular opacity, max 3.0 SUV, by pattern likely represents focal atelectasis. Chest CT in 3 months can be performed to assess for stability. 3. Persistent mild FDG uptake within subcentimeter bilateral hilar lymph nodes; nonspecific. 4. Mild diffuse uptake within the stomach, correlate for gastritis (max 6.3 SUV).  Diet, NPO after Midnight:      NPO Start Date: 06-Apr-2022,   NPO Start Time: 23:59  Except Medications (04-06-22 @ 11:22)

## 2022-05-14 ENCOUNTER — OUTPATIENT (OUTPATIENT)
Dept: OUTPATIENT SERVICES | Facility: HOSPITAL | Age: 72
LOS: 1 days | Discharge: HOME | End: 2022-05-14
Payer: MEDICARE

## 2022-05-14 DIAGNOSIS — R91.1 SOLITARY PULMONARY NODULE: ICD-10-CM

## 2022-05-14 PROCEDURE — 71250 CT THORAX DX C-: CPT | Mod: 26,MH

## 2022-08-03 ENCOUNTER — APPOINTMENT (OUTPATIENT)
Dept: HEMATOLOGY ONCOLOGY | Facility: CLINIC | Age: 72
End: 2022-08-03

## 2022-08-09 ENCOUNTER — APPOINTMENT (OUTPATIENT)
Dept: HEMATOLOGY ONCOLOGY | Facility: CLINIC | Age: 72
End: 2022-08-09

## 2022-09-15 ENCOUNTER — OUTPATIENT (OUTPATIENT)
Dept: OUTPATIENT SERVICES | Facility: HOSPITAL | Age: 72
LOS: 1 days | Discharge: HOME | End: 2022-09-15

## 2022-09-15 DIAGNOSIS — M25.552 PAIN IN LEFT HIP: ICD-10-CM

## 2022-09-15 PROCEDURE — 73502 X-RAY EXAM HIP UNI 2-3 VIEWS: CPT | Mod: 26,LT

## 2022-10-25 ENCOUNTER — RESULT REVIEW (OUTPATIENT)
Age: 72
End: 2022-10-25

## 2022-10-25 ENCOUNTER — OUTPATIENT (OUTPATIENT)
Dept: OUTPATIENT SERVICES | Facility: HOSPITAL | Age: 72
LOS: 1 days | Discharge: HOME | End: 2022-10-25

## 2022-10-25 DIAGNOSIS — M54.32 SCIATICA, LEFT SIDE: ICD-10-CM

## 2022-10-25 PROCEDURE — 72110 X-RAY EXAM L-2 SPINE 4/>VWS: CPT | Mod: 26

## 2022-11-23 ENCOUNTER — APPOINTMENT (OUTPATIENT)
Dept: NEUROSURGERY | Facility: CLINIC | Age: 72
End: 2022-11-23

## 2022-11-23 VITALS — HEIGHT: 63 IN | BODY MASS INDEX: 24.1 KG/M2 | WEIGHT: 136 LBS

## 2022-11-23 PROCEDURE — 99204 OFFICE O/P NEW MOD 45 MIN: CPT

## 2022-11-23 NOTE — HISTORY OF PRESENT ILLNESS
[de-identified] : This is a 73 yo F who presents for neurosurgical consultation, she is accompanied by her son who serves as . She notes chronic low back pain with an acuity of left low back pain with radicular features into the left lower extremity, present for approximately three months time. She has enrolled in physical therapy with over 12 sessions completed with minimal benefit noted. Remotely, she had a history of low back pain with radicular features for which she responded well to epidurals in the past, six months relief or longer. She denies loss of bladder/bowel function, saddle anesthesia, tripping/falling.\par \par X-ray L spine 10/25/2022-  evidence of osteopenia; L5 spondylolisthesis. Diffuse degenerative changes appreciated.\par \par PHYSICAL EXAM: \par Constitutional: Well appearing, no distress\par HEENT: Normocephalic Atraumatic\par Psychiatric: Alert and oriented to all spheres, normal mood\par Pulmonary: No respiratory distress\par \par Neurologic: \par CN II-XII grossly intact\par Palpation: bilateral lumbar paravertebral tenderness\par Strength: Full strength in all major muscle groups\par Sensation: Full sensation to light touch in all extremities\par Reflexes: \par  2+ patellar\par  2+ ankle jerk\par \par ROM limited all range of motion due to pain.\par \par Signs:\par SLR negative\par \par Gait: steady, walking w/o assistance.\par

## 2022-12-06 ENCOUNTER — RESULT REVIEW (OUTPATIENT)
Age: 72
End: 2022-12-06

## 2022-12-06 ENCOUNTER — OUTPATIENT (OUTPATIENT)
Dept: OUTPATIENT SERVICES | Facility: HOSPITAL | Age: 72
LOS: 1 days | Discharge: HOME | End: 2022-12-06

## 2022-12-06 DIAGNOSIS — M54.16 RADICULOPATHY, LUMBAR REGION: ICD-10-CM

## 2022-12-06 PROCEDURE — 72148 MRI LUMBAR SPINE W/O DYE: CPT | Mod: 26,MH

## 2022-12-21 ENCOUNTER — APPOINTMENT (OUTPATIENT)
Dept: NEUROSURGERY | Facility: CLINIC | Age: 72
End: 2022-12-21

## 2022-12-21 VITALS — WEIGHT: 136 LBS | HEIGHT: 63 IN | BODY MASS INDEX: 24.1 KG/M2

## 2022-12-21 DIAGNOSIS — M54.16 RADICULOPATHY, LUMBAR REGION: ICD-10-CM

## 2022-12-21 PROCEDURE — 99214 OFFICE O/P EST MOD 30 MIN: CPT

## 2022-12-21 NOTE — ASSESSMENT
[FreeTextEntry1] : This is a 73 yo F who presents for neurosurgical revisit accompanied by her son who serves as .  Since her last encounter, she notes that her pain has significantly improved and she is currently asymptomatic.  I have reviewed her imaging and testing and I have discussed all relevant findings at this time.  She will continue to monitor her symptoms moving forward.  Her son requested a new referral for pain management and I have provided him with a list of local providers.  If her pain were to return we have discussed that she can trial an epidural to the L2-3 segment and return to us if she were to fail such efforts.\par \par She will return to the office as needed moving forward.\par \par All questions and concerns have been answered and she is largely agreeable to proceed.\par \par Kathryn Brown PA-C\par Kimberly Gavin MD

## 2022-12-21 NOTE — HISTORY OF PRESENT ILLNESS
[de-identified] : This is a 73 yo F who presents for neurosurgical revisit, she is accompanied by her son who serves as .  As a review, at her last encounter she presented with chronic low back pain with an acuity of left low back pain with radicular features into the left lower extremity, present for approximately three months time. She has enrolled in physical therapy with over 12 sessions completed with minimal benefit noted. Remotely, she had a history of low back pain with radicular features for which she responded well to epidurals in the past, six months relief or longer. She denies loss of bladder/bowel function, saddle anesthesia, tripping/falling. Updated MR L Spine ordered and is now available for my review.\par \par She notes improvements in her symptoms as compared to her prior encounter.  She has no reported discomfort at this time.  She reports no pain with ambulation, sitting, standing, climbing stairs.  She denies any radicular features into the lower extremities.  I have reviewed her MRI at this time which reveals evidence of degenerative disc changes with evidence of L2-3 disc bulging with facet arthropathy at that level resulting in spinal canal narrowing. She denies reports of leg pain, weakness, fatigue. She denies saddle anesthesia, loss of bladder/bowel function.\par \par MR L Spine VZ 12/6/2022\par X-ray L spine 10/25/2022-  evidence of osteopenia; L5 spondylolisthesis. Diffuse degenerative changes appreciated.\par \par PHYSICAL EXAM: \par Constitutional: Well appearing, no distress\par HEENT: Normocephalic Atraumatic\par Psychiatric: Alert and oriented to all spheres, normal mood\par Pulmonary: No respiratory distress\par \par Neurologic: \par CN II-XII grossly intact\par Palpation: bilateral lumbar paravertebral tenderness\par Strength: Full strength in all major muscle groups\par Sensation: Full sensation to light touch in all extremities\par Reflexes: \par  2+ patellar\par  2+ ankle jerk\par \par ROM limited all range of motion due to pain.\par \par Signs:\par SLR negative\par \par Gait: steady, walking w/o assistance.\par

## 2023-06-10 ENCOUNTER — OUTPATIENT (OUTPATIENT)
Dept: OUTPATIENT SERVICES | Facility: HOSPITAL | Age: 73
LOS: 1 days | End: 2023-06-10
Payer: MEDICARE

## 2023-06-10 DIAGNOSIS — Z12.31 ENCOUNTER FOR SCREENING MAMMOGRAM FOR MALIGNANT NEOPLASM OF BREAST: ICD-10-CM

## 2023-06-10 PROCEDURE — 77063 BREAST TOMOSYNTHESIS BI: CPT

## 2023-06-10 PROCEDURE — 77067 SCR MAMMO BI INCL CAD: CPT | Mod: 26

## 2023-06-10 PROCEDURE — 77063 BREAST TOMOSYNTHESIS BI: CPT | Mod: 26

## 2023-06-10 PROCEDURE — 77067 SCR MAMMO BI INCL CAD: CPT

## 2023-06-11 DIAGNOSIS — Z12.31 ENCOUNTER FOR SCREENING MAMMOGRAM FOR MALIGNANT NEOPLASM OF BREAST: ICD-10-CM

## 2023-11-18 ENCOUNTER — OUTPATIENT (OUTPATIENT)
Dept: OUTPATIENT SERVICES | Facility: HOSPITAL | Age: 73
LOS: 1 days | End: 2023-11-18
Payer: MEDICARE

## 2023-11-18 DIAGNOSIS — Z00.8 ENCOUNTER FOR OTHER GENERAL EXAMINATION: ICD-10-CM

## 2023-11-18 DIAGNOSIS — R51.9 HEADACHE, UNSPECIFIED: ICD-10-CM

## 2023-11-18 PROCEDURE — 70551 MRI BRAIN STEM W/O DYE: CPT

## 2023-11-18 PROCEDURE — 70551 MRI BRAIN STEM W/O DYE: CPT | Mod: 26,MH

## 2023-11-19 DIAGNOSIS — R51.9 HEADACHE, UNSPECIFIED: ICD-10-CM

## 2023-12-04 ENCOUNTER — APPOINTMENT (OUTPATIENT)
Dept: CARDIOLOGY | Facility: CLINIC | Age: 73
End: 2023-12-04
Payer: MEDICARE

## 2023-12-04 VITALS
HEART RATE: 101 BPM | HEIGHT: 63 IN | BODY MASS INDEX: 24.8 KG/M2 | SYSTOLIC BLOOD PRESSURE: 98 MMHG | DIASTOLIC BLOOD PRESSURE: 66 MMHG | WEIGHT: 140 LBS

## 2023-12-04 PROCEDURE — 93000 ELECTROCARDIOGRAM COMPLETE: CPT

## 2023-12-04 PROCEDURE — 99204 OFFICE O/P NEW MOD 45 MIN: CPT

## 2023-12-04 RX ORDER — IBANDRONATE SODIUM 150 MG/1
150 TABLET, FILM COATED ORAL
Refills: 0 | Status: DISCONTINUED | COMMUNITY
End: 2023-12-04

## 2023-12-04 RX ORDER — IBANDRONATE SODIUM 150 MG/1
150 TABLET ORAL
Refills: 0 | Status: ACTIVE | COMMUNITY

## 2023-12-13 ENCOUNTER — OUTPATIENT (OUTPATIENT)
Dept: OUTPATIENT SERVICES | Facility: HOSPITAL | Age: 73
LOS: 1 days | End: 2023-12-13
Payer: MEDICARE

## 2023-12-13 ENCOUNTER — RESULT REVIEW (OUTPATIENT)
Age: 73
End: 2023-12-13

## 2023-12-13 DIAGNOSIS — Z00.8 ENCOUNTER FOR OTHER GENERAL EXAMINATION: ICD-10-CM

## 2023-12-13 DIAGNOSIS — R07.9 CHEST PAIN, UNSPECIFIED: ICD-10-CM

## 2023-12-13 PROCEDURE — 93018 CV STRESS TEST I&R ONLY: CPT

## 2023-12-13 PROCEDURE — 78452 HT MUSCLE IMAGE SPECT MULT: CPT

## 2023-12-13 PROCEDURE — 78452 HT MUSCLE IMAGE SPECT MULT: CPT | Mod: 26,MH

## 2023-12-13 PROCEDURE — A9500: CPT

## 2023-12-14 DIAGNOSIS — R07.9 CHEST PAIN, UNSPECIFIED: ICD-10-CM

## 2023-12-19 ENCOUNTER — APPOINTMENT (OUTPATIENT)
Dept: CARDIOLOGY | Facility: CLINIC | Age: 73
End: 2023-12-19
Payer: MEDICARE

## 2023-12-19 PROCEDURE — 93306 TTE W/DOPPLER COMPLETE: CPT

## 2023-12-26 ENCOUNTER — APPOINTMENT (OUTPATIENT)
Dept: CARDIOLOGY | Facility: CLINIC | Age: 73
End: 2023-12-26
Payer: MEDICARE

## 2023-12-26 PROCEDURE — 93880 EXTRACRANIAL BILAT STUDY: CPT

## 2023-12-28 ENCOUNTER — OUTPATIENT (OUTPATIENT)
Dept: OUTPATIENT SERVICES | Facility: HOSPITAL | Age: 73
LOS: 1 days | End: 2023-12-28
Payer: MEDICARE

## 2023-12-28 DIAGNOSIS — R07.9 CHEST PAIN, UNSPECIFIED: ICD-10-CM

## 2023-12-28 PROCEDURE — 71046 X-RAY EXAM CHEST 2 VIEWS: CPT | Mod: 26

## 2023-12-28 PROCEDURE — 71046 X-RAY EXAM CHEST 2 VIEWS: CPT

## 2023-12-29 DIAGNOSIS — R07.9 CHEST PAIN, UNSPECIFIED: ICD-10-CM

## 2024-01-02 ENCOUNTER — APPOINTMENT (OUTPATIENT)
Dept: CARDIOLOGY | Facility: CLINIC | Age: 74
End: 2024-01-02
Payer: MEDICARE

## 2024-01-02 VITALS
SYSTOLIC BLOOD PRESSURE: 88 MMHG | HEIGHT: 63 IN | HEART RATE: 103 BPM | DIASTOLIC BLOOD PRESSURE: 50 MMHG | WEIGHT: 141 LBS | BODY MASS INDEX: 24.98 KG/M2

## 2024-01-02 PROCEDURE — 93000 ELECTROCARDIOGRAM COMPLETE: CPT

## 2024-01-02 PROCEDURE — 99214 OFFICE O/P EST MOD 30 MIN: CPT

## 2024-01-02 RX ORDER — AMLODIPINE BESYLATE 5 MG/1
5 TABLET ORAL DAILY
Qty: 90 | Refills: 3 | Status: DISCONTINUED | COMMUNITY
Start: 1900-01-01 | End: 2024-01-02

## 2024-01-02 NOTE — PHYSICAL EXAM
[Well Developed] : well developed [Well Nourished] : well nourished [No Acute Distress] : no acute distress [Normal S1, S2] : normal S1, S2 [Normal] : no edema, no cyanosis, no clubbing, no varicosities [No Edema] : no edema [de-identified] : tachycardic

## 2024-01-02 NOTE — ASSESSMENT
[FreeTextEntry1] : 73 y.o. female  HTN, low BP and episodes of dizziness on Amlodipine. REID, etiology unclear. Normal LV systolic and diastolic function, normal RV function, no evidence of ischemia.  Plan:  Switch from Amlodipine to Losartan 25 mg daily. Pulmonary evaluations, PFT and CT chest. F/u in 1 month.  Thom Seth MD

## 2024-01-02 NOTE — REVIEW OF SYSTEMS
[Dyspnea on exertion] : dyspnea during exertion [Dizziness] : dizziness [Negative] : Musculoskeletal [Hearing Loss] : no hearing loss [Anxiety] : no anxiety

## 2024-01-02 NOTE — HISTORY OF PRESENT ILLNESS
[FreeTextEntry1] : 73 y.o. female with PMH of hypertension, skin cancer. Had 1 syncope in the past.  She had sudden elevation in BP (160/100) in November, was started on Amlodipine by her PCP. She noticed worsening in REID in September, she stopped going to the gym, becomes dyspneic after 1 block.  She reports fast heartbeat on BP monitor.   10/2023: , , trig 109. CR 0.89 GFR 68  Pt presents for a follow up visit. BP at home 90s-112/60s-70, had only one episode of high  since last visit, HR low 100s. Still has REID when she walks and postural dizziness, she is also c/o weakness. Denies chest pain.   2D ECHO: LVEF 61%, normal diastolic function. Mildly thickened aortic valve Mild MR Pericardial fat pad.  Lexiscan: No evidence of ischemia or infarction.  Carotid duplex:  No significant stenosis.

## 2024-02-14 ENCOUNTER — OUTPATIENT (OUTPATIENT)
Dept: OUTPATIENT SERVICES | Facility: HOSPITAL | Age: 74
LOS: 1 days | End: 2024-02-14
Payer: MEDICARE

## 2024-02-14 ENCOUNTER — APPOINTMENT (OUTPATIENT)
Dept: HEMATOLOGY ONCOLOGY | Facility: CLINIC | Age: 74
End: 2024-02-14
Payer: MEDICARE

## 2024-02-14 ENCOUNTER — LABORATORY RESULT (OUTPATIENT)
Age: 74
End: 2024-02-14

## 2024-02-14 VITALS
SYSTOLIC BLOOD PRESSURE: 131 MMHG | HEIGHT: 63 IN | RESPIRATION RATE: 16 BRPM | TEMPERATURE: 98.1 F | DIASTOLIC BLOOD PRESSURE: 77 MMHG | BODY MASS INDEX: 25.16 KG/M2 | HEART RATE: 96 BPM | WEIGHT: 142 LBS | OXYGEN SATURATION: 98 %

## 2024-02-14 DIAGNOSIS — C82.90 FOLLICULAR LYMPHOMA, UNSPECIFIED, UNSPECIFIED SITE: ICD-10-CM

## 2024-02-14 LAB
HCT VFR BLD CALC: 36.8 %
HGB BLD-MCNC: 12 G/DL
MCHC RBC-ENTMCNC: 27.6 PG
MCHC RBC-ENTMCNC: 32.6 G/DL
MCV RBC AUTO: 84.8 FL
PLATELET # BLD AUTO: 318 K/UL
PMV BLD: 10.4 FL
RBC # BLD: 4.34 M/UL
RBC # FLD: 15.5 %
WBC # FLD AUTO: 8.1 K/UL

## 2024-02-14 PROCEDURE — 83540 ASSAY OF IRON: CPT

## 2024-02-14 PROCEDURE — 84550 ASSAY OF BLOOD/URIC ACID: CPT

## 2024-02-14 PROCEDURE — 99215 OFFICE O/P EST HI 40 MIN: CPT

## 2024-02-14 PROCEDURE — 80076 HEPATIC FUNCTION PANEL: CPT

## 2024-02-14 PROCEDURE — 80048 BASIC METABOLIC PNL TOTAL CA: CPT

## 2024-02-14 PROCEDURE — 82728 ASSAY OF FERRITIN: CPT

## 2024-02-14 PROCEDURE — 85610 PROTHROMBIN TIME: CPT

## 2024-02-14 PROCEDURE — 83615 LACTATE (LD) (LDH) ENZYME: CPT

## 2024-02-14 PROCEDURE — 82746 ASSAY OF FOLIC ACID SERUM: CPT

## 2024-02-14 PROCEDURE — 85027 COMPLETE CBC AUTOMATED: CPT

## 2024-02-14 PROCEDURE — 83735 ASSAY OF MAGNESIUM: CPT

## 2024-02-14 PROCEDURE — 83550 IRON BINDING TEST: CPT

## 2024-02-14 NOTE — HISTORY OF PRESENT ILLNESS
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 Each time treated with RT. \par  \par  Biopsy on 12.5.18: left preauricular area: limited findings; can not assess the depth; differential includes pseudolymphoma or evolving lymphoproliferative disorder\par  \par  PMH: follicular lymphoma stage IE\par  FH: colon cancer in father and brother; lymphohistocytosis in mother\par  SH; no smoking [de-identified] : 1.25.19: biopsy: no lymphoma\par  \par  \par  PET/CT 1/30/19  IMPRESSION: Since 9/25/2017: 1. No definite sites of pathologic FDG uptake. 2. New 0.9 cm right anterior lower lobe linear nodular opacity, max 3.0 SUV, by pattern likely represents focal atelectasis. Chest CT in 3 months can be performed to assess for stability. 3. Persistent mild FDG uptake within subcentimeter bilateral hilar lymph nodes; nonspecific. 4. Mild diffuse uptake within the stomach, correlate for gastritis (max 6.3 SUV). \par  \par  10/8/2019 : The patient is here for follow up. She had a skin biopsy of left periauricular region in August 2019, which was negative for lymphoma . No RT needed . Since last visit, she has no new complaints, no fever, no weight loss, no palpable masses or lymph nodes. \par  \par  DANIELLE METCALF                      2\par  \par  \par  \par  Addendum Report\par  \par  \par  \par  \par  Addendum\par  Additional levels are examined on part 2 which show scant\par  epidermis with solar\par  elastosis.\par  \par  Verified by: Jg Alicea M.D.\par  (Electronic Signature)\par  Reported on: 04/29/19 15:52 EDT, [address] [facility], [place],\[place]  [place] [address]\par  _________________________________________________________________\par  \par  \par  Surgical Final Report\par  \par  \par  \par  \par  Final Diagnosis\par  1. Skin, left facial lateral, biopsy:\par  - Actinic keratosis.\par  \par  2. Skin, left facial, biopsy (#2):\par  - Fibroadipose tissue, insufficient for diagnosis.\par  - Additional levels pending.\par  \par  3. Skin, left facial, biopsy (#3):\par  - Actinic keratosis.\par  \par  4. Skin, left facial medial, biopsy:\par  - Actinic keratosis.\par  \par  Verified by: Jg Alicea M.D.\par  (Electronic Signature)\par  Reported on: 04/25/19 18:04 EDT, [address], [place],\[place]  [place] [address]\par  _________________________________________________________________\par  \par  Comment\par  This case was reviewed in the department and the diagnosis\par  represents a consensus opinion.\par  \par  Clinical History\par  Punch biopsy of left facial skin\par  \par  Specimen(s) Submitted\par  1     Punch biopsy left facial skin lateral\par  2     Punch biopsy left facial skin\par  3     Punch biopsy left facial skin\par  \par  \par  \par  \par  \par  DANIELLE METCALF                      2\par  \par  \par  \par  Surgical Final Report\par  \par  \par  \par  \par  4     Punch biopsy left facial skin medial\par  \par  Gross Description\par  1. The specimen is received in formalin, and labeled "punch\par  biopsy of the lateral"\par  and consists of a punch biopsy of tan skin measuring 0.1 cm in\par  diameter x 0.2 cm in depth.  The specimen is submitted entirely.\par  (1 block)\par  \par  2. The specimen is received in formalin, and labeled "punch\par  biopsy of periphery"\par  and consists of a punch biopsy of tan skin measuring 0.1 cm in\par  diameter x 0.2 cm in depth.  The specimen is submitted entirely.\par  (1 block)\par  \par  3. The specimen is received in formalin, and labeled "punch\par  biopsy #3" and consists of a punch biopsy of tan skin measuring\par  0.1 cm in diameter x 0.5 cm in depth.  The specimen is submitted\par  entirely. (1 block)\par  \par  4. The specimen is received in formalin, and labeled "punch\par  biopsy from medial"and consists of a punch biopsy of tan skin\par  measuring 0.1 cm in diameter x 0.3 cm in depth.  The specimen is\par  submitted entirely. (1 block)\par  \par  11/30/21\par  Patient is here for a follow-up visit, accompanied by daughter in law.  Patient denies fever, chills, night sweats, unintentional weight loss or bleeding.  She does report weakness and occasional dizziness, worse over the last 6 months or so.  She also reports hearing issues over the last year.  She underwent biopsy with DERM about 2 weeks ago.  Reviewed pathology results which are non-specific.  \par  PATH (11.10.2021 - [facility]ERMPATH DIAGNOST[facility]) INTERPRETATION:  PCR analysis of the immunoglobulin heavy and kappa chain (IGH, IGK) gene rearrangements was performed by multiplex PCR followed by capillary electrophoresis.  A positive result indicates the presence of a clonal B-cell expansion, except with immature lymphoid neoplasms where these rearrangement can be seen in other lineages.  The diagnostic sensitivity of this assay depends on the number of background B-cells in the samples, varyingfrom 1-10% clonal B-cells in a polyclonal B-cell background.  The reference range for B-cell gene rearrangments in non0-neoplastic tissues is negative.  \par  \par  PET/CT 12/2021\par  Since January 30, 2019, persistent mild FDG uptake in bilateral hilar \par  lymph nodes, nonspecific (max SUV 6 in a right hilar node, 20% increase).\par  \par  2.  Persistent mild diffuse FDG uptake in the stomach, nonspecific and \par  possibly inflammatory (max SUV 6.6). Further evaluation with an endoscopy \par  may be warranted, if not yet performed\par  \par  4/5/22\par  Patient is here for a follow-up visit for follicular lymphoma.  Since last visit, she completed RT to left neck from 1/11/22 - 1/28/22 with Dr. Guerrero.  She reports fatigue from RT.  Patient denies fever, chills, night sweats, unintentional weight loss or bleeding.

## 2024-02-14 NOTE — ASSESSMENT
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 - Follow up with dermatology, Dr. Leilani Sneed, as indicated - discussed w/ Dr Mauri Parks from hematopathology the impression of the latest bx (slides were not received); agreed that the impression indicates similar pathology as was present previously - s/p RT to left neck from 1/11/22 - 1/28/22 with Dr. Guerrero - Skin biopsy (Lt mid cheek) 1.29.2024 consistent with recurrence of B cell lymphoproliferative process  Plan: - Discussed with patient and family that she now has recurrence of grade 1 follicular lymphoma (now 4 recurrence). Suggested referral to [facility] to evaluate if patient is candidate of RT (last RT was 1.2022). If RT would not be an option, will discuss treatment with Rituximab weekly x4.  - PET scan ordered - [facility] referral  - Labs today CBC, BMP, LFTs, PT/PTT/INR, iron studies, vitamin B12, folate, MMA, uric acid, Mg - RTC in 1 month  SEEN/examined w/ [facility] fellow; note reviewed; case discusseed; agree w/ plan son and granddaughter are present will get restaging PET and refer to [facility] for RT if still local involvement

## 2024-02-14 NOTE — PHYSICAL EXAM
[Fully active, able to carry on all pre-disease performance without restriction] : Status 0 - Fully active, able to carry on all pre-disease performance without restriction [Normal] : affect appropriate [de-identified] : left pre auricular + neck site : 2 areas of redness , no palpable raised masses or lesions

## 2024-02-14 NOTE — REVIEW OF SYSTEMS
[Fatigue] : fatigue [Skin Rash] : skin rash [Negative] : Allergic/Immunologic [Fever] : no fever [Chills] : no chills [Night Sweats] : no night sweats [Recent Change In Weight] : ~T no recent weight change [de-identified] : reports L neck localized rash

## 2024-02-15 DIAGNOSIS — C82.90 FOLLICULAR LYMPHOMA, UNSPECIFIED, UNSPECIFIED SITE: ICD-10-CM

## 2024-02-15 LAB
ALBUMIN SERPL ELPH-MCNC: 4.3 G/DL
ALP BLD-CCNC: 101 U/L
ALT SERPL-CCNC: 21 U/L
ANION GAP SERPL CALC-SCNC: 14 MMOL/L
AST SERPL-CCNC: 18 U/L
BILIRUB DIRECT SERPL-MCNC: <0.2 MG/DL
BILIRUB INDIRECT SERPL-MCNC: NORMAL MG/DL
BILIRUB SERPL-MCNC: <0.2 MG/DL
BUN SERPL-MCNC: 20 MG/DL
CALCIUM SERPL-MCNC: 9.5 MG/DL
CHLORIDE SERPL-SCNC: 109 MMOL/L
CO2 SERPL-SCNC: 23 MMOL/L
CREAT SERPL-MCNC: 0.9 MG/DL
EGFR: 67 ML/MIN/1.73M2
FERRITIN SERPL-MCNC: 32 NG/ML
FOLATE SERPL-MCNC: 6.2 NG/ML
GLUCOSE SERPL-MCNC: 94 MG/DL
INR PPP: 1.03 RATIO
IRON SATN MFR SERPL: 12 %
IRON SERPL-MCNC: 39 UG/DL
LDH SERPL-CCNC: 189
MAGNESIUM SERPL-MCNC: 2.3 MG/DL
POTASSIUM SERPL-SCNC: 5 MMOL/L
PROT SERPL-MCNC: 7.3 G/DL
PT BLD: 11.8 SEC
SODIUM SERPL-SCNC: 146 MMOL/L
TIBC SERPL-MCNC: 335 UG/DL
UIBC SERPL-MCNC: 296 UG/DL
URATE SERPL-MCNC: 4.2 MG/DL

## 2024-02-21 ENCOUNTER — RESULT REVIEW (OUTPATIENT)
Age: 74
End: 2024-02-21

## 2024-02-21 ENCOUNTER — OUTPATIENT (OUTPATIENT)
Dept: OUTPATIENT SERVICES | Facility: HOSPITAL | Age: 74
LOS: 1 days | End: 2024-02-21
Payer: MEDICARE

## 2024-02-21 DIAGNOSIS — Z00.8 ENCOUNTER FOR OTHER GENERAL EXAMINATION: ICD-10-CM

## 2024-02-21 DIAGNOSIS — C82.90 FOLLICULAR LYMPHOMA, UNSPECIFIED, UNSPECIFIED SITE: ICD-10-CM

## 2024-02-21 LAB — GLUCOSE BLDC GLUCOMTR-MCNC: 80 MG/DL — SIGNIFICANT CHANGE UP (ref 70–99)

## 2024-02-21 PROCEDURE — 78815 PET IMAGE W/CT SKULL-THIGH: CPT | Mod: 26,PS,MH

## 2024-02-21 PROCEDURE — 82962 GLUCOSE BLOOD TEST: CPT

## 2024-02-21 PROCEDURE — 78815 PET IMAGE W/CT SKULL-THIGH: CPT | Mod: PS

## 2024-02-21 PROCEDURE — A9552: CPT

## 2024-02-22 ENCOUNTER — OUTPATIENT (OUTPATIENT)
Dept: OUTPATIENT SERVICES | Facility: HOSPITAL | Age: 74
LOS: 1 days | End: 2024-02-22
Payer: MEDICARE

## 2024-02-22 ENCOUNTER — APPOINTMENT (OUTPATIENT)
Dept: RADIATION ONCOLOGY | Facility: HOSPITAL | Age: 74
End: 2024-02-22
Payer: MEDICARE

## 2024-02-22 VITALS
DIASTOLIC BLOOD PRESSURE: 71 MMHG | SYSTOLIC BLOOD PRESSURE: 102 MMHG | HEART RATE: 65 BPM | TEMPERATURE: 98.2 F | HEIGHT: 63 IN | WEIGHT: 142.13 LBS | RESPIRATION RATE: 16 BRPM | BODY MASS INDEX: 25.18 KG/M2 | OXYGEN SATURATION: 99 %

## 2024-02-22 DIAGNOSIS — C82.90 FOLLICULAR LYMPHOMA, UNSPECIFIED, UNSPECIFIED SITE: ICD-10-CM

## 2024-02-22 PROCEDURE — 99214 OFFICE O/P EST MOD 30 MIN: CPT

## 2024-02-22 PROCEDURE — 99204 OFFICE O/P NEW MOD 45 MIN: CPT

## 2024-02-22 RX ORDER — LOSARTAN POTASSIUM 25 MG/1
25 TABLET, FILM COATED ORAL
Qty: 90 | Refills: 1 | Status: DISCONTINUED | COMMUNITY
Start: 2024-01-02 | End: 2024-02-22

## 2024-02-22 RX ORDER — DEXLANSOPRAZOLE 30 MG/1
30 CAPSULE, DELAYED RELEASE ORAL DAILY
Refills: 0 | Status: DISCONTINUED | COMMUNITY
End: 2024-02-22

## 2024-02-22 NOTE — VITALS
[Date: ____________] : Patient's last distress assessment performed on [unfilled]. [5 - Distress Level] : Distress Level: 5 [Referred Patient  to social work for follow-up] : Patient was referred to social work for follow-up [Patient given social work contact information and resource sheet] : Patient was given social work contact information and resource sheet [Maximal Pain Intensity: 0/10] : 0/10 [70: Cares for self; unalbe to carry on normal activity or do active work.] : 70: Cares for self; unable to carry on normal activity or do active work. [ECOG Performance Status: 2 - Ambulatory and capable of all self care but unable to carry out any work activities] : Performance Status: 2 - Ambulatory and capable of all self care but unable to carry out any work activities. Up and about more than 50% of waking hours

## 2024-02-26 DIAGNOSIS — C85.80 OTHER SPECIFIED TYPES OF NON-HODGKIN LYMPHOMA, UNSPECIFIED SITE: ICD-10-CM

## 2024-02-29 ENCOUNTER — APPOINTMENT (OUTPATIENT)
Dept: CARDIOLOGY | Facility: CLINIC | Age: 74
End: 2024-02-29
Payer: MEDICARE

## 2024-02-29 VITALS
DIASTOLIC BLOOD PRESSURE: 60 MMHG | HEART RATE: 99 BPM | WEIGHT: 142 LBS | BODY MASS INDEX: 25.16 KG/M2 | HEIGHT: 63 IN | SYSTOLIC BLOOD PRESSURE: 96 MMHG

## 2024-02-29 PROCEDURE — 93000 ELECTROCARDIOGRAM COMPLETE: CPT

## 2024-02-29 PROCEDURE — 99214 OFFICE O/P EST MOD 30 MIN: CPT

## 2024-02-29 RX ORDER — AMLODIPINE BESYLATE 10 MG/1
10 TABLET ORAL DAILY
Refills: 0 | Status: DISCONTINUED | COMMUNITY

## 2024-02-29 RX ORDER — FREMANEZUMAB-VFRM 225 MG/1.5ML
225 INJECTION SUBCUTANEOUS
Refills: 0 | Status: ACTIVE | COMMUNITY

## 2024-02-29 NOTE — HISTORY OF PRESENT ILLNESS
[FreeTextEntry1] : 74 y/o female with PMH of hypertension, skin cancer. Had 1 syncope in the past.  She had sudden elevation in BP (160/100) in November, was started on Amlodipine by her PCP. She noticed worsening in REID in September, she stopped going to the gym, becomes dyspneic after 1 block.  She reports fast heartbeat on BP monitor.   10/2023: , , trig 109. CR 0.89 GFR 68  Pt presents for a follow up visit. BP at home 90s-112/60s-70, had only one episode of high  since last visit, HR low 100s. Still has REID when she walks and postural dizziness, she is also c/o weakness. Denies chest pain.   2D ECHO: LVEF 61%, normal diastolic function. Mildly thickened aortic valve Mild MR Pericardial fat pad.  Lexiscan: No evidence of ischemia or infarction.  Carotid duplex:  No significant stenosis.

## 2024-02-29 NOTE — REVIEW OF SYSTEMS
[Dyspnea on exertion] : dyspnea during exertion [Dizziness] : dizziness [Negative] : Musculoskeletal [Anxiety] : no anxiety [Hearing Loss] : no hearing loss

## 2024-02-29 NOTE — PHYSICAL EXAM
[Well Developed] : well developed [No Acute Distress] : no acute distress [Well Nourished] : well nourished [Normal S1, S2] : normal S1, S2 [Normal] : no edema, no cyanosis, no clubbing, no varicosities [No Edema] : no edema [de-identified] : tachycardic

## 2024-02-29 NOTE — ASSESSMENT
[FreeTextEntry1] : 72 y/o female  HTN, low BP and episodes of dizziness now. REID, etiology unclear. Normal LV systolic and diastolic function, normal RV function, no evidence of ischemia.  Plan:  Hold Losartan for now, continue to monitor BP. Pulmonary evaluations, PFT and CT chest. F/u in 3 months.  Thom Seth MD

## 2024-03-01 ENCOUNTER — OUTPATIENT (OUTPATIENT)
Dept: OUTPATIENT SERVICES | Facility: HOSPITAL | Age: 74
LOS: 1 days | End: 2024-03-01
Payer: MEDICARE

## 2024-03-01 PROCEDURE — 77334 RADIATION TREATMENT AID(S): CPT | Mod: 26

## 2024-03-01 PROCEDURE — 77263 THER RADIOLOGY TX PLNG CPLX: CPT

## 2024-03-01 PROCEDURE — 77290 THER RAD SIMULAJ FIELD CPLX: CPT | Mod: 26

## 2024-03-01 PROCEDURE — 77334 RADIATION TREATMENT AID(S): CPT

## 2024-03-01 PROCEDURE — 77290 THER RAD SIMULAJ FIELD CPLX: CPT

## 2024-03-01 NOTE — PHYSICAL EXAM
[General Appearance - Alert] : alert [General Appearance - Well Developed] : well developed [General Appearance - In No Acute Distress] : in no acute distress [] : no respiratory distress [Oriented To Time, Place, And Person] : oriented to person, place, and time [de-identified] : see images/ left cheek area

## 2024-03-01 NOTE — HISTORY OF PRESENT ILLNESS
[FreeTextEntry1] : Ms.Bella Martel is 73yr old female here once again for consultation for her recurrent follicular lymphoma. She has been treated with radiation on a few occasions for lymphoma. Most recent RT was 1/11/2022-1/28/2022 which was 2400cGy/12fx to left neck with . Patient believes she has been treated at least 4 times. Will review previous RT records. Patient would like for us to communicate with her daughter-in law, Alecia, concerning any medical matters [phone]. She follows up with [facility], .   Pathology: 1/29/2024: A. LEFT MID CHEEK INFERIOR-         SURFACE OF NODULAR LYMPHOCYTIC INFILTRATE B. 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        Petscan: 2/21/2024: IMPRESSION: Compared to 12/09/2021, no definite sites of pathologic FDG uptake clearly representing biologic tumor activity and therefore no definite new sites of abnormal FDG uptake  Stable nonspecific mild FDG uptake in right and left jacob (SUV 4.9 vs 6) right paratracheal subcentimeter node (SUV 4.9 vs 4.5) and subcentimeter subcarinal density (SUV 4 vs 5.2 ). These are nonspecific and may represent inflammatory process based on relative stability over the past 2 years.  Specifically, no abnormal FDG uptake in the region of the left cheek-the region of the recently biopsied skin lesion.

## 2024-03-01 NOTE — REVIEW OF SYSTEMS
[Fatigue] : fatigue [SOB on Exertion] : shortness of breath during exertion [Loss of Hearing] : loss of hearing [Joint Pain] : joint pain [Anxiety] : anxiety [Negative] : Allergic/Immunologic [Suicidal] : not suicidal [Depression] : no depression [Insomnia] : no insomnia [FreeTextEntry3] : glasses/ left eye vision changes/ sees opthalogist [FreeTextEntry4] : left hearing aid [FreeTextEntry5] : sees a cardiologist [FreeTextEntry7] : she had diarhea last week, and her doctor prescribed steriods and completed the course, and now better [de-identified] : has home attendant 7days a week to assist with ADL's. [de-identified] :  lymphoma of skin/ left cheek area treated and left neck and left eye brow region as well [de-identified] : anxiety about if she can get treated again

## 2024-03-06 ENCOUNTER — APPOINTMENT (OUTPATIENT)
Dept: PULMONOLOGY | Facility: CLINIC | Age: 74
End: 2024-03-06
Payer: MEDICARE

## 2024-03-06 ENCOUNTER — LABORATORY RESULT (OUTPATIENT)
Age: 74
End: 2024-03-06

## 2024-03-06 VITALS
DIASTOLIC BLOOD PRESSURE: 60 MMHG | RESPIRATION RATE: 12 BRPM | SYSTOLIC BLOOD PRESSURE: 130 MMHG | WEIGHT: 144 LBS | HEIGHT: 63 IN | BODY MASS INDEX: 25.52 KG/M2

## 2024-03-06 PROCEDURE — 99203 OFFICE O/P NEW LOW 30 MIN: CPT | Mod: 25

## 2024-03-06 PROCEDURE — 94010 BREATHING CAPACITY TEST: CPT

## 2024-03-06 RX ORDER — ALBUTEROL SULFATE 90 UG/1
108 (90 BASE) INHALANT RESPIRATORY (INHALATION)
Qty: 1 | Refills: 0 | Status: ACTIVE | COMMUNITY
Start: 2024-03-06 | End: 1900-01-01

## 2024-03-06 NOTE — PHYSICAL EXAM
[No Acute Distress] : no acute distress [Normal Oropharynx] : normal oropharynx [Normal Appearance] : normal appearance [No Neck Mass] : no neck mass [Normal S1, S2] : normal s1, s2 [Normal Rate/Rhythm] : normal rate/rhythm [No Murmurs] : no murmurs [No Abnormalities] : no abnormalities [No Resp Distress] : no resp distress [Benign] : benign [Normal Gait] : normal gait [No Clubbing] : no clubbing [No Edema] : no edema [No Focal Deficits] : no focal deficits [Normal Affect] : normal affect [Oriented x3] : oriented x3 [TextBox_68] : Decreased air entry bilaterally.

## 2024-03-06 NOTE — HISTORY OF PRESENT ILLNESS
[TextBox_4] : 73 year old female presented with REID, starting in July 2023, now progressively worsening. She can only walk 1 block before getting SOB. Reports cold days make it worse and on clint days her SOB improves.

## 2024-03-06 NOTE — REVIEW OF SYSTEMS
[Dyspnea] : dyspnea [Fever] : no fever [Chills] : no chills [Dry Eyes] : no dry eyes [Cough] : no cough [Eye Irritation] : no eye irritation [Hemoptysis] : no hemoptysis [Sputum] : no sputum [Chest Discomfort] : no chest discomfort [Abdominal Pain] : no abdominal pain [Nausea] : no nausea [Diarrhea] : no diarrhea [Arthralgias] : no arthralgias [Myalgias] : no myalgias [Back Pain] : no back pain

## 2024-03-07 DIAGNOSIS — C85.80 OTHER SPECIFIED TYPES OF NON-HODGKIN LYMPHOMA, UNSPECIFIED SITE: ICD-10-CM

## 2024-03-07 DIAGNOSIS — C82.90 FOLLICULAR LYMPHOMA, UNSPECIFIED, UNSPECIFIED SITE: ICD-10-CM

## 2024-03-14 ENCOUNTER — OUTPATIENT (OUTPATIENT)
Dept: OUTPATIENT SERVICES | Facility: HOSPITAL | Age: 74
LOS: 1 days | End: 2024-03-14
Payer: MEDICARE

## 2024-03-14 ENCOUNTER — APPOINTMENT (OUTPATIENT)
Dept: HEMATOLOGY ONCOLOGY | Facility: CLINIC | Age: 74
End: 2024-03-14
Payer: MEDICARE

## 2024-03-14 VITALS
HEIGHT: 63 IN | TEMPERATURE: 98.1 F | RESPIRATION RATE: 12 BRPM | BODY MASS INDEX: 26.22 KG/M2 | HEART RATE: 95 BPM | SYSTOLIC BLOOD PRESSURE: 120 MMHG | WEIGHT: 148 LBS | DIASTOLIC BLOOD PRESSURE: 69 MMHG

## 2024-03-14 DIAGNOSIS — C82.90 FOLLICULAR LYMPHOMA, UNSPECIFIED, UNSPECIFIED SITE: ICD-10-CM

## 2024-03-14 PROCEDURE — 99214 OFFICE O/P EST MOD 30 MIN: CPT

## 2024-03-14 NOTE — REVIEW OF SYSTEMS
[Fatigue] : fatigue [Skin Rash] : skin rash [Negative] : Allergic/Immunologic [Fever] : no fever [Chills] : no chills [Night Sweats] : no night sweats [Recent Change In Weight] : ~T no recent weight change [de-identified] : reports L neck localized rash

## 2024-03-14 NOTE — ASSESSMENT
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 - Follow up with dermatology, Dr. Leilani Sneed, as indicated - discussed w/ Dr Mauri Parks from hematopathology the impression of the latest bx (slides were not received); agreed that the impression indicates similar pathology as was present previously - s/p RT to left neck from 1/11/22 - 1/28/22 with Dr. Guerrero - Skin biopsy (Lt mid cheek) 1.29.2024 consistent with recurrence of B cell lymphoproliferative process - Discussed with patient and family that she now has recurrence of grade 1 follicular lymphoma (now 4 recurrence). Suggested referral to [facility] to evaluate if patient is candidate of RT (last RT was 1.2022). If RT would not be an option, will discuss treatment with Rituximab weekly x4.  - followup with [facility], Dr. Wynne, as scheduled for re-RT for recurrence w/ 30Gy/12fx - PET/CT 2.21.2024 shows nonspecific mild FDG uptake in right and left jacob, right paratracheal subcentimeter node and subcentimeter subcarinal density which may be inflammatory.    will contact [facility] to get the status of the initiation of RT RTC in 3 mos

## 2024-03-14 NOTE — CONSULT LETTER
[Dear  ___] : Dear  [unfilled], [Consult Letter:] : I had the pleasure of evaluating your patient, [unfilled]. [Consult Closing:] : Thank you very much for allowing me to participate in the care of this patient.  If you have any questions, please do not hesitate to contact me. [Please see my note below.] : Please see my note below. [FreeTextEntry3] : Alan Lyons DO Attending Physician, Hematology/ Medical Oncology [phone] office  [Sincerely,] : Sincerely,

## 2024-03-14 NOTE — HISTORY OF PRESENT ILLNESS
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 Each time treated with RT. \par  \par  Biopsy on 12.5.18: left preauricular area: limited findings; can not assess the depth; differential includes pseudolymphoma or evolving lymphoproliferative disorder\par  \par  PMH: follicular lymphoma stage IE\par  FH: colon cancer in father and brother; lymphohistocytosis in mother\par  SH; no smoking [de-identified] : 1.25.19: biopsy: no lymphoma   PET/CT 1/30/19  IMPRESSION: Since 9/25/2017: 1. No definite sites of pathologic FDG uptake. 2. New 0.9 cm right anterior lower lobe linear nodular opacity, max 3.0 SUV, by pattern likely represents focal atelectasis. Chest CT in 3 months can be performed to assess for stability. 3. Persistent mild FDG uptake within subcentimeter bilateral hilar lymph nodes; nonspecific. 4. Mild diffuse uptake within the stomach, correlate for gastritis (max 6.3 SUV).   10/8/2019 : The patient is here for follow up. She had a skin biopsy of left periauricular region in August 2019, which was negative for lymphoma . No RT needed . Since last visit, she has no new complaints, no fever, no weight loss, no palpable masses or lymph nodes.   DANIELLE METCALF                      2    Addendum Report     Addendum Additional levels are examined on part 2 which show scant epidermis with solar elastosis.  Verified by: Jg Alicea M.D. (Electronic Signature) Reported on: 04/29/19 15:52 EDT, [address] [place] [address] _________________________________________________________________   Surgical Final Report     Final Diagnosis 1. Skin, left facial lateral, biopsy: - Actinic keratosis.  2. Skin, left facial, biopsy (#2): - Fibroadipose tissue, insufficient for diagnosis. - Additional levels pending.  3. Skin, left facial, biopsy (#3): - Actinic keratosis.  4. Skin, left facial medial, biopsy: - Actinic keratosis.  Verified by: Jg Alicea M.D. (Electronic Signature) Reported on: 04/25/19 18:04 EDT, [address] [place] [address] _________________________________________________________________  Comment This case was reviewed in the department and the diagnosis represents a consensus opinion.  Clinical History Punch biopsy of left facial skin  Specimen(s) Submitted 1     Punch biopsy left facial skin lateral 2     Punch biopsy left facial skin 3     Punch biopsy left facial skin      DANIELLE METCALF                      2    Surgical Final Report     4     Punch biopsy left facial skin medial  Gross Description 1. The specimen is received in formalin, and labeled "punch biopsy of the lateral" and consists of a punch biopsy of tan skin measuring 0.1 cm in diameter x 0.2 cm in depth.  The specimen is submitted entirely. (1 block)  2. The specimen is received in formalin, and labeled "punch biopsy of periphery" and consists of a punch biopsy of tan skin measuring 0.1 cm in diameter x 0.2 cm in depth.  The specimen is submitted entirely. (1 block)  3. The specimen is received in formalin, and labeled "punch biopsy #3" and consists of a punch biopsy of tan skin measuring 0.1 cm in diameter x 0.5 cm in depth.  The specimen is submitted entirely. (1 block)  4. The specimen is received in formalin, and labeled "punch biopsy from medial"and consists of a punch biopsy of tan skin measuring 0.1 cm in diameter x 0.3 cm in depth.  The specimen is submitted entirely. (1 block)  11/30/21 Patient is here for a follow-up visit, accompanied by daughter in law.  Patient denies fever, chills, night sweats, unintentional weight loss or bleeding.  She does report weakness and occasional dizziness, worse over the last 6 months or so.  She also reports hearing issues over the last year.  She underwent biopsy with DERM about 2 weeks ago.  Reviewed pathology results which are non-specific.   PATH (11.10.2021 - DERMPATH DIAGNOSTICS) INTERPRETATION:  PCR analysis of the immunoglobulin heavy and kappa chain (IGH, IGK) gene rearrangements was performed by multiplex PCR followed by capillary electrophoresis.  A positive result indicates the presence of a clonal B-cell expansion, except with immature lymphoid neoplasms where these rearrangement can be seen in other lineages.  The diagnostic sensitivity of this assay depends on the number of background B-cells in the samples, varyingfrom 1-10% clonal B-cells in a polyclonal B-cell background.  The reference range for B-cell gene rearrangments in non0-neoplastic tissues is negative.    PET/CT 12/2021 Since January 30, 2019, persistent mild FDG uptake in bilateral hilar  lymph nodes, nonspecific (max SUV 6 in a right hilar node, 20% increase).  2.  Persistent mild diffuse FDG uptake in the stomach, nonspecific and  possibly inflammatory (max SUV 6.6). Further evaluation with an endoscopy  may be warranted, if not yet performed  4/5/22 Patient is here for a follow-up visit for follicular lymphoma.  Since last visit, she completed RT to left neck from 1/11/22 - 1/28/22 with Dr. Guerrero.  She reports fatigue from RT.  Patient denies fever, chills, night sweats, unintentional weight loss or bleeding.    3/14/24 Patient is here for a follow-up visit for follicular lymphoma.  [facility] completed RT to left neck from 1/11/22 - 1/28/22 with Dr. Guerrero.  Patient denies fever, chills, night sweats, unintentional weight loss or bleeding.  Reviewed most recent PET/CT which shows no definite sites of pathologic FDG uptake; there is nonspecific mild FDG uptake in right and left jacob, right paratracheal subcentimeter node and subcentimeter subcarinal density which may be inflammatory.   PET/CT (2.21.2024) IMPRESSION:Compared to 12/09/2021, no definite sites of pathologic FDG uptake clearly representing biologic tumor activity and therefore no definite new sites of abnormal FDG uptake.Stable nonspecific mild FDG uptake in right and left jacob (SUV 4.9 vs 6) right paratracheal subcentimeter node (SUV 4.9 vs 4.5) and subcentimeter subcarinal density (SUV 4 vs 5.2 ). These are nonspecific and may represent inflammatory process based on relative stability over the past 2 years.Specifically no abnormal FDG uptake in the region of the left cheek-the region of the recently biopsied skin lesion.

## 2024-03-14 NOTE — PHYSICAL EXAM
[Fully active, able to carry on all pre-disease performance without restriction] : Status 0 - Fully active, able to carry on all pre-disease performance without restriction [Normal] : affect appropriate [de-identified] : left pre auricular + neck site : 2 areas of redness , no palpable raised masses or lesions

## 2024-03-15 ENCOUNTER — OUTPATIENT (OUTPATIENT)
Dept: OUTPATIENT SERVICES | Facility: HOSPITAL | Age: 74
LOS: 1 days | End: 2024-03-15

## 2024-03-15 DIAGNOSIS — C82.90 FOLLICULAR LYMPHOMA, UNSPECIFIED, UNSPECIFIED SITE: ICD-10-CM

## 2024-03-15 PROCEDURE — 77334 RADIATION TREATMENT AID(S): CPT | Mod: 26

## 2024-03-15 PROCEDURE — 77300 RADIATION THERAPY DOSE PLAN: CPT | Mod: 26

## 2024-03-18 DIAGNOSIS — C85.80 OTHER SPECIFIED TYPES OF NON-HODGKIN LYMPHOMA, UNSPECIFIED SITE: ICD-10-CM

## 2024-03-18 DIAGNOSIS — C82.90 FOLLICULAR LYMPHOMA, UNSPECIFIED, UNSPECIFIED SITE: ICD-10-CM

## 2024-03-19 ENCOUNTER — OUTPATIENT (OUTPATIENT)
Dept: OUTPATIENT SERVICES | Facility: HOSPITAL | Age: 74
LOS: 1 days | End: 2024-03-19

## 2024-03-19 PROCEDURE — 77427 RADIATION TX MANAGEMENT X5: CPT

## 2024-03-20 ENCOUNTER — OUTPATIENT (OUTPATIENT)
Dept: OUTPATIENT SERVICES | Facility: HOSPITAL | Age: 74
LOS: 1 days | End: 2024-03-20

## 2024-03-20 DIAGNOSIS — C85.80 OTHER SPECIFIED TYPES OF NON-HODGKIN LYMPHOMA, UNSPECIFIED SITE: ICD-10-CM

## 2024-03-20 DIAGNOSIS — C82.90 FOLLICULAR LYMPHOMA, UNSPECIFIED, UNSPECIFIED SITE: ICD-10-CM

## 2024-03-21 ENCOUNTER — OUTPATIENT (OUTPATIENT)
Dept: OUTPATIENT SERVICES | Facility: HOSPITAL | Age: 74
LOS: 1 days | End: 2024-03-21

## 2024-03-22 ENCOUNTER — OUTPATIENT (OUTPATIENT)
Dept: OUTPATIENT SERVICES | Facility: HOSPITAL | Age: 74
LOS: 1 days | End: 2024-03-22

## 2024-03-22 DIAGNOSIS — C85.80 OTHER SPECIFIED TYPES OF NON-HODGKIN LYMPHOMA, UNSPECIFIED SITE: ICD-10-CM

## 2024-03-22 DIAGNOSIS — C82.90 FOLLICULAR LYMPHOMA, UNSPECIFIED, UNSPECIFIED SITE: ICD-10-CM

## 2024-03-25 ENCOUNTER — OUTPATIENT (OUTPATIENT)
Dept: OUTPATIENT SERVICES | Facility: HOSPITAL | Age: 74
LOS: 1 days | End: 2024-03-25

## 2024-03-25 DIAGNOSIS — C85.80 OTHER SPECIFIED TYPES OF NON-HODGKIN LYMPHOMA, UNSPECIFIED SITE: ICD-10-CM

## 2024-03-25 DIAGNOSIS — C82.90 FOLLICULAR LYMPHOMA, UNSPECIFIED, UNSPECIFIED SITE: ICD-10-CM

## 2024-03-26 ENCOUNTER — OUTPATIENT (OUTPATIENT)
Dept: OUTPATIENT SERVICES | Facility: HOSPITAL | Age: 74
LOS: 1 days | End: 2024-03-26

## 2024-03-26 ENCOUNTER — NON-APPOINTMENT (OUTPATIENT)
Age: 74
End: 2024-03-26

## 2024-03-26 VITALS
TEMPERATURE: 98.7 F | RESPIRATION RATE: 16 BRPM | BODY MASS INDEX: 25.88 KG/M2 | HEART RATE: 98 BPM | WEIGHT: 146.13 LBS | OXYGEN SATURATION: 98 % | DIASTOLIC BLOOD PRESSURE: 80 MMHG | SYSTOLIC BLOOD PRESSURE: 114 MMHG

## 2024-03-26 PROCEDURE — 77427 RADIATION TX MANAGEMENT X5: CPT

## 2024-03-27 ENCOUNTER — OUTPATIENT (OUTPATIENT)
Dept: OUTPATIENT SERVICES | Facility: HOSPITAL | Age: 74
LOS: 1 days | End: 2024-03-27

## 2024-03-27 DIAGNOSIS — C85.80 OTHER SPECIFIED TYPES OF NON-HODGKIN LYMPHOMA, UNSPECIFIED SITE: ICD-10-CM

## 2024-03-27 DIAGNOSIS — C82.90 FOLLICULAR LYMPHOMA, UNSPECIFIED, UNSPECIFIED SITE: ICD-10-CM

## 2024-03-27 NOTE — HISTORY OF PRESENT ILLNESS
[FreeTextEntry1] : 3/26/2024 OTV 6/12 to the left cheek: Mrs. Martel is c/o fatigue. She is napping for a few hours a day, which helps with her fatigue. She is experiencing nausea, which is causing her to have a decreased appetite. No vomiting, no diarrhea. She states she is not taking antiemetics at this time. Dietary supplements like ensure or boost recommended. She denies pain.   Ms.Bella Martel is 73yr old female here once again for consultation for her recurrent follicular lymphoma. She has been treated with radiation on a few occasions for lymphoma. Most recent RT was 1/11/2022-1/28/2022 which was 2400cGy/12fx to left neck with . Patient believes she has been treated at least 4 times. Will review previous RT records. Patient would like for us to communicate with her daughter-in law, Alecia, concerning any medical matters [phone]. She follows up with [facility], .   Pathology: 1/29/2024: A. LEFT MID CHEEK INFERIOR-         SURFACE OF NODULAR LYMPHOCYTIC INFILTRATE B. LEFT MID CHEEK SUPERIOR-          SURFACE OF NODULAR LYMPHOCYTIC INFILTRATE          Note A, B: in keeping with findings seen in previous biopsies, particularly [id number]- HD-A and B, this is consistent with persistence or recurrence of a B-cell lymphoproliferative process.        Petscan: 2/21/2024: IMPRESSION: Compared to 12/09/2021, no definite sites of pathologic FDG uptake clearly representing biologic tumor activity and therefore no definite new sites of abnormal FDG uptake  Stable nonspecific mild FDG uptake in right and left jacob (SUV 4.9 vs 6) right paratracheal subcentimeter node (SUV 4.9 vs 4.5) and subcentimeter subcarinal density (SUV 4 vs 5.2 ). These are nonspecific and may represent inflammatory process based on relative stability over the past 2 years.  Specifically, no abnormal FDG uptake in the region of the left cheek-the region of the recently biopsied skin lesion.

## 2024-03-27 NOTE — DISEASE MANAGEMENT
[Clinical] : TNM Stage: c [N/A] : Currently not applicable [TTNM] : x [NTNM] : x [MTNM] : x [de-identified] : 1500cGy [de-identified] : 3000cGy [de-identified] : left cheek

## 2024-03-28 ENCOUNTER — OUTPATIENT (OUTPATIENT)
Dept: OUTPATIENT SERVICES | Facility: HOSPITAL | Age: 74
LOS: 1 days | End: 2024-03-28

## 2024-03-28 DIAGNOSIS — C85.80 OTHER SPECIFIED TYPES OF NON-HODGKIN LYMPHOMA, UNSPECIFIED SITE: ICD-10-CM

## 2024-03-28 DIAGNOSIS — C82.90 FOLLICULAR LYMPHOMA, UNSPECIFIED, UNSPECIFIED SITE: ICD-10-CM

## 2024-03-29 ENCOUNTER — OUTPATIENT (OUTPATIENT)
Dept: OUTPATIENT SERVICES | Facility: HOSPITAL | Age: 74
LOS: 1 days | End: 2024-03-29

## 2024-03-29 DIAGNOSIS — C82.90 FOLLICULAR LYMPHOMA, UNSPECIFIED, UNSPECIFIED SITE: ICD-10-CM

## 2024-03-29 DIAGNOSIS — C85.80 OTHER SPECIFIED TYPES OF NON-HODGKIN LYMPHOMA, UNSPECIFIED SITE: ICD-10-CM

## 2024-04-01 ENCOUNTER — OUTPATIENT (OUTPATIENT)
Dept: OUTPATIENT SERVICES | Facility: HOSPITAL | Age: 74
LOS: 1 days | End: 2024-04-01
Payer: MEDICARE

## 2024-04-01 PROCEDURE — 77412 RADIATION TX DELIVERY LVL 3: CPT

## 2024-04-02 ENCOUNTER — NON-APPOINTMENT (OUTPATIENT)
Age: 74
End: 2024-04-02

## 2024-04-02 ENCOUNTER — OUTPATIENT (OUTPATIENT)
Dept: OUTPATIENT SERVICES | Facility: HOSPITAL | Age: 74
LOS: 1 days | End: 2024-04-02

## 2024-04-02 VITALS
OXYGEN SATURATION: 100 % | TEMPERATURE: 97.8 F | HEART RATE: 104 BPM | SYSTOLIC BLOOD PRESSURE: 91 MMHG | BODY MASS INDEX: 25.93 KG/M2 | DIASTOLIC BLOOD PRESSURE: 65 MMHG | WEIGHT: 146.38 LBS | RESPIRATION RATE: 16 BRPM

## 2024-04-02 DIAGNOSIS — C85.80 OTHER SPECIFIED TYPES OF NON-HODGKIN LYMPHOMA, UNSPECIFIED SITE: ICD-10-CM

## 2024-04-02 DIAGNOSIS — C82.90 FOLLICULAR LYMPHOMA, UNSPECIFIED, UNSPECIFIED SITE: ICD-10-CM

## 2024-04-02 NOTE — HISTORY OF PRESENT ILLNESS
[FreeTextEntry1] : 4/2/2024 OTV: 11 of 12 treatments to the left Cheek: Patient is c/o fatigue, even more this week than last week. She is still experiencing nausea, and is eating less than last week. Denies vomiting. Denies diarrhea. Denies taking antiemetics at this time.   3/26/2024 OTV 6/12 to the left cheek: Mrs. Martel is c/o fatigue. She is napping for a few hours a day, which helps with her fatigue. She is experiencing nausea, which is causing her to have a decreased appetite. No vomiting, no diarrhea. She states she is not taking antiemetics at this time. Dietary supplements like ensure or boost recommended. She denies pain.   Ms.Bella Martel is 73yr old female here once again for consultation for her recurrent follicular lymphoma. She has been treated with radiation on a few occasions for lymphoma. Most recent RT was 1/11/2022-1/28/2022 which was 2400cGy/12fx to left neck with . Patient believes she has been treated at least 4 times. Will review previous RT records. Patient would like for us to communicate with her daughter-in law, Alecia, concerning any medical matters [phone]. She follows up with [facility], .   Pathology: 1/29/2024: A. LEFT MID CHEEK INFERIOR-         SURFACE OF NODULAR LYMPHOCYTIC INFILTRATE B. 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        Petscan: 2/21/2024: IMPRESSION: Compared to 12/09/2021, no definite sites of pathologic FDG uptake clearly representing biologic tumor activity and therefore no definite new sites of abnormal FDG uptake  Stable nonspecific mild FDG uptake in right and left jacob (SUV 4.9 vs 6) right paratracheal subcentimeter node (SUV 4.9 vs 4.5) and subcentimeter subcarinal density (SUV 4 vs 5.2 ). These are nonspecific and may represent inflammatory process based on relative stability over the past 2 years.  Specifically, no abnormal FDG uptake in the region of the left cheek-the region of the recently biopsied skin lesion.

## 2024-04-02 NOTE — DISEASE MANAGEMENT
[Clinical] : TNM Stage: c [N/A] : Currently not applicable [TTNM] : x [NTNM] : x [de-identified] : 1250sBk [MTNM] : x [de-identified] : 3000cGy [de-identified] : left cheek

## 2024-04-03 ENCOUNTER — OUTPATIENT (OUTPATIENT)
Dept: OUTPATIENT SERVICES | Facility: HOSPITAL | Age: 74
LOS: 1 days | End: 2024-04-03

## 2024-04-05 DIAGNOSIS — C82.90 FOLLICULAR LYMPHOMA, UNSPECIFIED, UNSPECIFIED SITE: ICD-10-CM

## 2024-04-05 DIAGNOSIS — C85.80 OTHER SPECIFIED TYPES OF NON-HODGKIN LYMPHOMA, UNSPECIFIED SITE: ICD-10-CM

## 2024-05-15 ENCOUNTER — OUTPATIENT (OUTPATIENT)
Dept: OUTPATIENT SERVICES | Facility: HOSPITAL | Age: 74
LOS: 1 days | End: 2024-05-15
Payer: MEDICARE

## 2024-05-15 ENCOUNTER — APPOINTMENT (OUTPATIENT)
Dept: RADIATION ONCOLOGY | Facility: HOSPITAL | Age: 74
End: 2024-05-15
Payer: MEDICARE

## 2024-05-15 VITALS
RESPIRATION RATE: 16 BRPM | TEMPERATURE: 97.3 F | BODY MASS INDEX: 26.25 KG/M2 | OXYGEN SATURATION: 98 % | HEART RATE: 112 BPM | SYSTOLIC BLOOD PRESSURE: 93 MMHG | WEIGHT: 148.19 LBS | DIASTOLIC BLOOD PRESSURE: 59 MMHG

## 2024-05-15 DIAGNOSIS — C85.80 OTHER SPECIFIED TYPES OF NON-HODGKIN LYMPHOMA, UNSPECIFIED SITE: ICD-10-CM

## 2024-05-15 DIAGNOSIS — Z92.3 PERSONAL HISTORY OF IRRADIATION: ICD-10-CM

## 2024-05-15 PROCEDURE — 99024 POSTOP FOLLOW-UP VISIT: CPT

## 2024-05-15 NOTE — REVIEW OF SYSTEMS
[Fatigue] : fatigue [Loss of Hearing] : loss of hearing [SOB on Exertion] : shortness of breath during exertion [Anxiety] : anxiety [FreeTextEntry7] : she had diarhea last week, and her doctor prescribed steriods and completed the course, and now better [Negative] : Gastrointestinal [Joint Pain] : no joint pain [Suicidal] : not suicidal [Insomnia] : no insomnia [Depression] : no depression [FreeTextEntry3] : glasses/ left eye vision changes/ sees opthalogist [FreeTextEntry4] : left hearing aid [FreeTextEntry5] : sees a cardiologist [FreeTextEntry6] : sees pulmonary [de-identified] :  lymphoma of skin/ left cheek area treated and left neck and left eye brow region as well [de-identified] : has home attendant 7days a week to assist with ADL's. [de-identified] : anxiety

## 2024-05-15 NOTE — DISEASE MANAGEMENT
[Clinical] : TNM Stage: c [N/A] : Currently not applicable [TTNM] : x [NTNM] : x [MTNM] : x [de-identified] : 3000cGy [de-identified] : 3000cGy [de-identified] : left cheek

## 2024-05-15 NOTE — HISTORY OF PRESENT ILLNESS
[FreeTextEntry1] : 5/15/2024:  DANIELLE MARTEL returns to clinic in follow up visit.  As you know, the patient is a 73 year old F with a diagnosis of recurrent follicular lymphoma.   The patient received 3000cGy/12Fx to the left cheek from 3/19/2024 through 4/3/2024.  I last saw her in April.    In the interim, the patient reports doing well. She continues moisturizing her face. She feels it has improved since RT.  She does still feel low energy, fatigue and shortness of breath on exertion. She has a follow up with cardiology and pulmonary coming up. She has not yet made a follow up with .   Upcoming appointments: Cardiology follow up May 28, 2024 Pulmonary follow up and PFT's on June 20, 2024 4/2/2024 OTV: 11 of 12 treatments to the left Cheek: Patient is c/o fatigue, even more this week than last week. She is still experiencing nausea, and is eating less than last week. Denies vomiting. Denies diarrhea. Denies taking antiemetics at this time.   3/26/2024 OTV 6/12 to the left cheek: Mrs. Martel is c/o fatigue. She is napping for a few hours a day, which helps with her fatigue. She is experiencing nausea, which is causing her to have a decreased appetite. No vomiting, no diarrhea. She states she is not taking antiemetics at this time. Dietary supplements like ensure or boost recommended. She denies pain.   Ms.Bella Martel is 73yr old female here once again for consultation for her recurrent follicular lymphoma. She has been treated with radiation on a few occasions for lymphoma. Most recent RT was 1/11/2022-1/28/2022 which was 2400cGy/12fx to left neck with . Patient believes she has been treated at least 4 times. Will review previous RT records. Patient would like for us to communicate with her daughter-in law, Alecia, concerning any medical matters [phone]. She follows up with [facility], .   Pathology: 1/29/2024: A. LEFT MID CHEEK INFERIOR-         SURFACE OF NODULAR LYMPHOCYTIC INFILTRATE B. LEFT MID CHEEK SUPERIOR-          SURFACE OF NODULAR LYMPHOCYTIC INFILTRATE          Note A, B: in keeping with findings seen in previous biopsies, particularly [id number]- HD-A and B, this is consistent with persistence or recurrence of a B-cell lymphoproliferative process.        Petscan: 2/21/2024: IMPRESSION: Compared to 12/09/2021, no definite sites of pathologic FDG uptake clearly representing biologic tumor activity and therefore no definite new sites of abnormal FDG uptake  Stable nonspecific mild FDG uptake in right and left jacob (SUV 4.9 vs 6) right paratracheal subcentimeter node (SUV 4.9 vs 4.5) and subcentimeter subcarinal density (SUV 4 vs 5.2 ). These are nonspecific and may represent inflammatory process based on relative stability over the past 2 years.  Specifically, no abnormal FDG uptake in the region of the left cheek-the region of the recently biopsied skin lesion.

## 2024-05-28 ENCOUNTER — APPOINTMENT (OUTPATIENT)
Dept: CARDIOLOGY | Facility: CLINIC | Age: 74
End: 2024-05-28
Payer: MEDICARE

## 2024-05-28 VITALS
DIASTOLIC BLOOD PRESSURE: 62 MMHG | HEART RATE: 102 BPM | SYSTOLIC BLOOD PRESSURE: 90 MMHG | BODY MASS INDEX: 25.87 KG/M2 | HEIGHT: 63 IN | WEIGHT: 146 LBS

## 2024-05-28 DIAGNOSIS — I10 ESSENTIAL (PRIMARY) HYPERTENSION: ICD-10-CM

## 2024-05-28 DIAGNOSIS — R42 DIZZINESS AND GIDDINESS: ICD-10-CM

## 2024-05-28 DIAGNOSIS — R06.09 OTHER FORMS OF DYSPNEA: ICD-10-CM

## 2024-05-28 PROCEDURE — 93000 ELECTROCARDIOGRAM COMPLETE: CPT

## 2024-05-28 PROCEDURE — 99214 OFFICE O/P EST MOD 30 MIN: CPT

## 2024-05-28 NOTE — ASSESSMENT
[FreeTextEntry1] : 72 y/o female  HTN, low BP and episodes of dizziness now. REID, etiology unclear. Normal LV systolic and diastolic function, normal RV function, no evidence of ischemia.  Plan:  Patient advised to increase fluid intake to at least 1.5 L/day. She will continue to monitor BP. Pulmonary evaluation in progress. F/u in 3 months.  Thom Seth MD

## 2024-05-28 NOTE — REVIEW OF SYSTEMS
[Dyspnea on exertion] : dyspnea during exertion [Dizziness] : dizziness [Negative] : Musculoskeletal [Hearing Loss] : no hearing loss [Chest Discomfort] : no chest discomfort [Anxiety] : no anxiety

## 2024-05-28 NOTE — PHYSICAL EXAM
[Well Developed] : well developed [Well Nourished] : well nourished [No Acute Distress] : no acute distress [Normal S1, S2] : normal S1, S2 [Normal] : no edema, no cyanosis, no clubbing, no varicosities [No Edema] : no edema [de-identified] : tachycardic

## 2024-05-28 NOTE — HISTORY OF PRESENT ILLNESS
[FreeTextEntry1] : 74 y/o female with PMH of hypertension, skin cancer. Had 1 syncope in the past.  She had sudden elevation in BP (160/100) in November, was started on Amlodipine by her PCP. She noticed worsening in REID in September, she stopped going to the gym, becomes dyspneic after 1 block.  She reports fast heartbeat on BP monitor.   10/2023: , , trig 109. CR 0.89 GFR 68  Pt presents for a follow up visit. BP at home 90s-110/60s-70s. HR low 100s.  Still has REID, went to pulm for PFTs and was started on inhaler. Still c/o weakness.   2D ECHO: LVEF 61%, normal diastolic function. Mildly thickened aortic valve Mild MR Pericardial fat pad.  Lexiscan: No evidence of ischemia or infarction.  Carotid duplex:  No significant stenosis.

## 2024-06-12 ENCOUNTER — APPOINTMENT (OUTPATIENT)
Age: 74
End: 2024-06-12

## 2024-06-12 ENCOUNTER — OUTPATIENT (OUTPATIENT)
Dept: OUTPATIENT SERVICES | Facility: HOSPITAL | Age: 74
LOS: 1 days | End: 2024-06-12
Payer: MEDICARE

## 2024-06-12 ENCOUNTER — LABORATORY RESULT (OUTPATIENT)
Age: 74
End: 2024-06-12

## 2024-06-12 DIAGNOSIS — C82.90 FOLLICULAR LYMPHOMA, UNSPECIFIED, UNSPECIFIED SITE: ICD-10-CM

## 2024-06-12 LAB
ALBUMIN SERPL ELPH-MCNC: 4.2 G/DL
ALP BLD-CCNC: 91 U/L
ALT SERPL-CCNC: 21 U/L
ANION GAP SERPL CALC-SCNC: 13 MMOL/L
AST SERPL-CCNC: 20 U/L
BILIRUB DIRECT SERPL-MCNC: <0.2 MG/DL
BILIRUB INDIRECT SERPL-MCNC: >0.1 MG/DL
BILIRUB SERPL-MCNC: 0.3 MG/DL
BUN SERPL-MCNC: 19 MG/DL
CALCIUM SERPL-MCNC: 9 MG/DL
CHLORIDE SERPL-SCNC: 105 MMOL/L
CO2 SERPL-SCNC: 24 MMOL/L
CREAT SERPL-MCNC: 1 MG/DL
EGFR: 59 ML/MIN/1.73M2
GLUCOSE SERPL-MCNC: 104 MG/DL
HCT VFR BLD CALC: 36.2 %
HGB BLD-MCNC: 11.7 G/DL
IRON SATN MFR SERPL: 22 %
IRON SERPL-MCNC: 76 UG/DL
LDH SERPL-CCNC: 185
MCHC RBC-ENTMCNC: 27.1 PG
MCHC RBC-ENTMCNC: 32.3 G/DL
MCV RBC AUTO: 84 FL
PLATELET # BLD AUTO: 308 K/UL
PMV BLD: 10 FL
POTASSIUM SERPL-SCNC: 4.1 MMOL/L
PROT SERPL-MCNC: 7.1 G/DL
RBC # BLD: 4.31 M/UL
RBC # FLD: 15.3 %
SODIUM SERPL-SCNC: 142 MMOL/L
TIBC SERPL-MCNC: 339 UG/DL
UIBC SERPL-MCNC: 263 UG/DL
WBC # FLD AUTO: 7.31 K/UL

## 2024-06-12 PROCEDURE — 83540 ASSAY OF IRON: CPT

## 2024-06-12 PROCEDURE — 82728 ASSAY OF FERRITIN: CPT

## 2024-06-12 PROCEDURE — 83550 IRON BINDING TEST: CPT

## 2024-06-12 PROCEDURE — 80076 HEPATIC FUNCTION PANEL: CPT

## 2024-06-12 PROCEDURE — 36415 COLL VENOUS BLD VENIPUNCTURE: CPT

## 2024-06-12 PROCEDURE — 80048 BASIC METABOLIC PNL TOTAL CA: CPT

## 2024-06-12 PROCEDURE — 85027 COMPLETE CBC AUTOMATED: CPT

## 2024-06-12 PROCEDURE — 83615 LACTATE (LD) (LDH) ENZYME: CPT

## 2024-06-13 DIAGNOSIS — C82.90 FOLLICULAR LYMPHOMA, UNSPECIFIED, UNSPECIFIED SITE: ICD-10-CM

## 2024-06-13 LAB — FERRITIN SERPL-MCNC: 27 NG/ML

## 2024-06-18 ENCOUNTER — APPOINTMENT (OUTPATIENT)
Age: 74
End: 2024-06-18
Payer: MEDICARE

## 2024-06-18 ENCOUNTER — OUTPATIENT (OUTPATIENT)
Dept: OUTPATIENT SERVICES | Facility: HOSPITAL | Age: 74
LOS: 1 days | End: 2024-06-18
Payer: MEDICARE

## 2024-06-18 VITALS
BODY MASS INDEX: 28.13 KG/M2 | SYSTOLIC BLOOD PRESSURE: 115 MMHG | TEMPERATURE: 97.5 F | HEART RATE: 114 BPM | OXYGEN SATURATION: 94 % | RESPIRATION RATE: 16 BRPM | DIASTOLIC BLOOD PRESSURE: 72 MMHG | WEIGHT: 149 LBS | HEIGHT: 61 IN

## 2024-06-18 DIAGNOSIS — C82.90 FOLLICULAR LYMPHOMA, UNSPECIFIED, UNSPECIFIED SITE: ICD-10-CM

## 2024-06-18 PROCEDURE — 99214 OFFICE O/P EST MOD 30 MIN: CPT

## 2024-06-18 NOTE — REVIEW OF SYSTEMS
[Fever] : no fever [Chills] : no chills [Night Sweats] : no night sweats [Fatigue] : fatigue [Recent Change In Weight] : ~T no recent weight change [Skin Rash] : skin rash [Negative] : Allergic/Immunologic [de-identified] : reports L neck localized rash

## 2024-06-18 NOTE — ASSESSMENT
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 - Follow up with dermatology, Dr. Leilani Sneed, as indicated - discussed w/ Dr Mauri Parks from hematopathology the impression of the latest bx (slides were not received); agreed that the impression indicates similar pathology as was present previously - s/p RT to left neck from 1/11/22 - 1/28/22 with Dr. Guerrero - Skin biopsy (Lt mid cheek) 1.29.2024 consistent with recurrence of B cell lymphoproliferative process - Discussed with patient and family that she now has recurrence of grade 1 follicular lymphoma (now 4 recurrence). Suggested referral to [facility] to evaluate if patient is candidate of RT (last RT was 1.2022). If RT would not be an option, will discuss treatment with Rituximab weekly x4.  - followup with [facility], Dr. Wynne, as scheduled for re-RT for recurrence w/ 30Gy/12fx - PET/CT 2.21.2024 shows nonspecific mild FDG uptake in right and left jacob, right paratracheal subcentimeter node and subcentimeter subcarinal density which may be inflammatory.    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

## 2024-06-18 NOTE — HISTORY OF PRESENT ILLNESS
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 Each time treated with RT. \par  \par  Biopsy on 12.5.18: left preauricular area: limited findings; can not assess the depth; differential includes pseudolymphoma or evolving lymphoproliferative disorder\par  \par  PMH: follicular lymphoma stage IE\par  FH: colon cancer in father and brother; lymphohistocytosis in mother\par  SH; no smoking [de-identified] : 1.25.19: biopsy: no lymphoma   PET/CT 1/30/19  IMPRESSION: Since 9/25/2017: 1. No definite sites of pathologic FDG uptake. 2. New 0.9 cm right anterior lower lobe linear nodular opacity, max 3.0 SUV, by pattern likely represents focal atelectasis. Chest CT in 3 months can be performed to assess for stability. 3. Persistent mild FDG uptake within subcentimeter bilateral hilar lymph nodes; nonspecific. 4. Mild diffuse uptake within the stomach, correlate for gastritis (max 6.3 SUV).   10/8/2019 : The patient is here for follow up. She had a skin biopsy of left periauricular region in August 2019, which was negative for lymphoma . No RT needed . Since last visit, she has no new complaints, no fever, no weight loss, no palpable masses or lymph nodes.   DANIELLE METCALF                      2    Addendum Report     Addendum Additional levels are examined on part 2 which show scant epidermis with solar elastosis.  Verified by: Jg Alicea M.D. (Electronic Signature) Reported on: 04/29/19 15:52 EDT, [address] [place] [address] _________________________________________________________________   Surgical Final Report     Final Diagnosis 1. Skin, left facial lateral, biopsy: - Actinic keratosis.  2. Skin, left facial, biopsy (#2): - Fibroadipose tissue, insufficient for diagnosis. - Additional levels pending.  3. Skin, left facial, biopsy (#3): - Actinic keratosis.  4. Skin, left facial medial, biopsy: - Actinic keratosis.  Verified by: Jg Alicea M.D. (Electronic Signature) Reported on: 04/25/19 18:04 EDT, [address] [place] [address] _________________________________________________________________  Comment This case was reviewed in the department and the diagnosis represents a consensus opinion.  Clinical History Punch biopsy of left facial skin  Specimen(s) Submitted 1     Punch biopsy left facial skin lateral 2     Punch biopsy left facial skin 3     Punch biopsy left facial skin      DANIELLE METCALF                      2    Surgical Final Report     4     Punch biopsy left facial skin medial  Gross Description 1. The specimen is received in formalin, and labeled "punch biopsy of the lateral" and consists of a punch biopsy of tan skin measuring 0.1 cm in diameter x 0.2 cm in depth.  The specimen is submitted entirely. (1 block)  2. The specimen is received in formalin, and labeled "punch biopsy of periphery" and consists of a punch biopsy of tan skin measuring 0.1 cm in diameter x 0.2 cm in depth.  The specimen is submitted entirely. (1 block)  3. The specimen is received in formalin, and labeled "punch biopsy #3" and consists of a punch biopsy of tan skin measuring 0.1 cm in diameter x 0.5 cm in depth.  The specimen is submitted entirely. (1 block)  4. The specimen is received in formalin, and labeled "punch biopsy from medial"and consists of a punch biopsy of tan skin measuring 0.1 cm in diameter x 0.3 cm in depth.  The specimen is submitted entirely. (1 block)  11/30/21 Patient is here for a follow-up visit, accompanied by daughter in law.  Patient denies fever, chills, night sweats, unintentional weight loss or bleeding.  She does report weakness and occasional dizziness, worse over the last 6 months or so.  She also reports hearing issues over the last year.  She underwent biopsy with DERM about 2 weeks ago.  Reviewed pathology results which are non-specific.   PATH (11.10.2021 - DERMPATH DIAGNOSTICS) INTERPRETATION:  PCR analysis of the immunoglobulin heavy and kappa chain (IGH, IGK) gene rearrangements was performed by multiplex PCR followed by capillary electrophoresis.  A positive result indicates the presence of a clonal B-cell expansion, except with immature lymphoid neoplasms where these rearrangement can be seen in other lineages.  The diagnostic sensitivity of this assay depends on the number of background B-cells in the samples, varyingfrom 1-10% clonal B-cells in a polyclonal B-cell background.  The reference range for B-cell gene rearrangments in non0-neoplastic tissues is negative.    PET/CT 12/2021 Since January 30, 2019, persistent mild FDG uptake in bilateral hilar  lymph nodes, nonspecific (max SUV 6 in a right hilar node, 20% increase).  2.  Persistent mild diffuse FDG uptake in the stomach, nonspecific and  possibly inflammatory (max SUV 6.6). Further evaluation with an endoscopy  may be warranted, if not yet performed  4/5/22 Patient is here for a follow-up visit for follicular lymphoma.  Since last visit, she completed RT to left neck from 1/11/22 - 1/28/22 with Dr. Guerrero.  She reports fatigue from RT.  Patient denies fever, chills, night sweats, unintentional weight loss or bleeding.    3/14/24 Patient is here for a follow-up visit for follicular lymphoma.  Since last visit, she was evaluated by [facility]ne; completed RT to left neck from 1/11/22 - 1/28/22 with Dr. Guerrero.  Patient denies fever, chills, night sweats, unintentional weight loss or bleeding.  Reviewed most recent PET/CT which shows no definite sites of pathologic FDG uptake; there is nonspecific mild FDG uptake in right and left jacob, right paratracheal subcentimeter node and subcentimeter subcarinal density which may be inflammatory.   PET/CT (2.21.2024) IMPRESSION:Compared to 12/09/2021, no definite sites of pathologic FDG uptake clearly representing biologic tumor activity and therefore no definite new sites of abnormal FDG uptake.Stable nonspecific mild FDG uptake in right and left jacob (SUV 4.9 vs 6) right paratracheal subcentimeter node (SUV 4.9 vs 4.5) and subcentimeter subcarinal density (SUV 4 vs 5.2 ). These are nonspecific and may represent inflammatory process based on relative stability over the past 2 years.Specifically no abnormal FDG uptake in the region of the left cheek-the region of the recently biopsied skin lesion.

## 2024-06-18 NOTE — CONSULT LETTER
[Dear  ___] : Dear  [unfilled], [Consult Letter:] : I had the pleasure of evaluating your patient, [unfilled]. [Please see my note below.] : Please see my note below. [Consult Closing:] : Thank you very much for allowing me to participate in the care of this patient.  If you have any questions, please do not hesitate to contact me. [Sincerely,] : Sincerely, [FreeTextEntry3] : Alan Lyons DO Attending Physician, Hematology/ Medical Oncology [phone] office

## 2024-06-18 NOTE — PHYSICAL EXAM
[Fully active, able to carry on all pre-disease performance without restriction] : Status 0 - Fully active, able to carry on all pre-disease performance without restriction [Normal] : affect appropriate [de-identified] : left pre auricular + neck site : 2 areas of redness , no palpable raised masses or lesions

## 2024-06-20 ENCOUNTER — LABORATORY RESULT (OUTPATIENT)
Age: 74
End: 2024-06-20

## 2024-06-20 ENCOUNTER — APPOINTMENT (OUTPATIENT)
Dept: PULMONOLOGY | Facility: CLINIC | Age: 74
End: 2024-06-20
Payer: MEDICARE

## 2024-06-20 ENCOUNTER — OUTPATIENT (OUTPATIENT)
Dept: OUTPATIENT SERVICES | Facility: HOSPITAL | Age: 74
LOS: 1 days | End: 2024-06-20
Payer: MEDICARE

## 2024-06-20 VITALS
DIASTOLIC BLOOD PRESSURE: 70 MMHG | OXYGEN SATURATION: 99 % | BODY MASS INDEX: 28.34 KG/M2 | SYSTOLIC BLOOD PRESSURE: 128 MMHG | WEIGHT: 150 LBS | RESPIRATION RATE: 26 BRPM | HEART RATE: 104 BPM

## 2024-06-20 DIAGNOSIS — Z12.31 ENCOUNTER FOR SCREENING MAMMOGRAM FOR MALIGNANT NEOPLASM OF BREAST: ICD-10-CM

## 2024-06-20 DIAGNOSIS — J45.20 MILD INTERMITTENT ASTHMA, UNCOMPLICATED: ICD-10-CM

## 2024-06-20 PROCEDURE — 94010 BREATHING CAPACITY TEST: CPT

## 2024-06-20 PROCEDURE — 99214 OFFICE O/P EST MOD 30 MIN: CPT | Mod: 25

## 2024-06-20 PROCEDURE — 77063 BREAST TOMOSYNTHESIS BI: CPT

## 2024-06-20 PROCEDURE — 77063 BREAST TOMOSYNTHESIS BI: CPT | Mod: 26

## 2024-06-20 PROCEDURE — 77067 SCR MAMMO BI INCL CAD: CPT

## 2024-06-20 PROCEDURE — 94729 DIFFUSING CAPACITY: CPT

## 2024-06-20 PROCEDURE — 77067 SCR MAMMO BI INCL CAD: CPT | Mod: 26

## 2024-06-20 PROCEDURE — 94727 GAS DIL/WSHOT DETER LNG VOL: CPT

## 2024-06-20 RX ORDER — FLUTICASONE FUROATE AND VILANTEROL TRIFENATATE 200; 25 UG/1; UG/1
200-25 POWDER RESPIRATORY (INHALATION)
Qty: 1 | Refills: 5 | Status: ACTIVE | COMMUNITY
Start: 2024-06-20 | End: 1900-01-01

## 2024-06-20 NOTE — PHYSICAL EXAM
[No Acute Distress] : no acute distress [Normal Oropharynx] : normal oropharynx [Normal Appearance] : normal appearance [No Neck Mass] : no neck mass [Normal S1, S2] : normal s1, s2 [Normal Rate/Rhythm] : normal rate/rhythm [No Murmurs] : no murmurs [No Resp Distress] : no resp distress [No Abnormalities] : no abnormalities [Benign] : benign [Normal Gait] : normal gait [No Edema] : no edema [No Clubbing] : no clubbing [No Focal Deficits] : no focal deficits [Oriented x3] : oriented x3 [Normal Affect] : normal affect [TextBox_68] : Decreased air entry bilaterally.

## 2024-06-20 NOTE — ASSESSMENT
[FreeTextEntry1] : Dyspnea on exertion.  possibly late onset HAAD  HO Lymphoma following  wiht Hem onc  SP PET 2-24 SP cardiac work up

## 2024-06-20 NOTE — REVIEW OF SYSTEMS
[Fever] : no fever [Chills] : no chills [Dry Eyes] : no dry eyes [Eye Irritation] : no eye irritation [Cough] : no cough [Hemoptysis] : no hemoptysis [Sputum] : no sputum [Dyspnea] : dyspnea [Chest Discomfort] : no chest discomfort [Abdominal Pain] : no abdominal pain [Nausea] : no nausea [Diarrhea] : no diarrhea [Arthralgias] : no arthralgias [Myalgias] : no myalgias [Back Pain] : no back pain

## 2024-06-21 DIAGNOSIS — Z12.31 ENCOUNTER FOR SCREENING MAMMOGRAM FOR MALIGNANT NEOPLASM OF BREAST: ICD-10-CM

## 2024-06-26 ENCOUNTER — OUTPATIENT (OUTPATIENT)
Dept: OUTPATIENT SERVICES | Facility: HOSPITAL | Age: 74
LOS: 1 days | End: 2024-06-26
Payer: MEDICARE

## 2024-06-26 DIAGNOSIS — R92.8 OTHER ABNORMAL AND INCONCLUSIVE FINDINGS ON DIAGNOSTIC IMAGING OF BREAST: ICD-10-CM

## 2024-06-26 PROCEDURE — 76642 ULTRASOUND BREAST LIMITED: CPT | Mod: RT

## 2024-06-26 PROCEDURE — G0279: CPT | Mod: 26

## 2024-06-26 PROCEDURE — 77065 DX MAMMO INCL CAD UNI: CPT | Mod: 26,RT

## 2024-06-26 PROCEDURE — G0279: CPT

## 2024-06-26 PROCEDURE — 77065 DX MAMMO INCL CAD UNI: CPT | Mod: RT

## 2024-06-26 PROCEDURE — 76642 ULTRASOUND BREAST LIMITED: CPT | Mod: 26,RT

## 2024-06-27 DIAGNOSIS — R92.8 OTHER ABNORMAL AND INCONCLUSIVE FINDINGS ON DIAGNOSTIC IMAGING OF BREAST: ICD-10-CM

## 2024-07-02 ENCOUNTER — APPOINTMENT (OUTPATIENT)
Age: 74
End: 2024-07-02

## 2024-07-02 ENCOUNTER — OUTPATIENT (OUTPATIENT)
Dept: OUTPATIENT SERVICES | Facility: HOSPITAL | Age: 74
LOS: 1 days | End: 2024-07-02
Payer: MEDICARE

## 2024-07-02 VITALS — DIASTOLIC BLOOD PRESSURE: 65 MMHG | HEART RATE: 92 BPM | SYSTOLIC BLOOD PRESSURE: 96 MMHG | TEMPERATURE: 98 F

## 2024-07-02 DIAGNOSIS — C82.90 FOLLICULAR LYMPHOMA, UNSPECIFIED, UNSPECIFIED SITE: ICD-10-CM

## 2024-07-02 PROCEDURE — 96365 THER/PROPH/DIAG IV INF INIT: CPT

## 2024-07-02 RX ORDER — IRON SUCROSE 20 MG/ML
200 INJECTION, SOLUTION INTRAVENOUS ONCE
Refills: 0 | Status: COMPLETED | OUTPATIENT
Start: 2024-07-02 | End: 2024-07-02

## 2024-07-02 RX ADMIN — IRON SUCROSE 200 MILLIGRAM(S): 20 INJECTION, SOLUTION INTRAVENOUS at 10:45

## 2024-07-02 RX ADMIN — IRON SUCROSE 200 MILLIGRAM(S): 20 INJECTION, SOLUTION INTRAVENOUS at 10:08

## 2024-07-03 DIAGNOSIS — C82.90 FOLLICULAR LYMPHOMA, UNSPECIFIED, UNSPECIFIED SITE: ICD-10-CM

## 2024-07-10 ENCOUNTER — OUTPATIENT (OUTPATIENT)
Dept: OUTPATIENT SERVICES | Facility: HOSPITAL | Age: 74
LOS: 1 days | End: 2024-07-10
Payer: MEDICARE

## 2024-07-10 ENCOUNTER — APPOINTMENT (OUTPATIENT)
Age: 74
End: 2024-07-10
Payer: MEDICARE

## 2024-07-10 DIAGNOSIS — R92.8 OTHER ABNORMAL AND INCONCLUSIVE FINDINGS ON DIAGNOSTIC IMAGING OF BREAST: ICD-10-CM

## 2024-07-10 PROCEDURE — 19083 BX BREAST 1ST LESION US IMAG: CPT | Mod: RT

## 2024-07-10 PROCEDURE — 19084 BX BREAST ADD LESION US IMAG: CPT | Mod: RT

## 2024-07-10 PROCEDURE — 77065 DX MAMMO INCL CAD UNI: CPT | Mod: RT

## 2024-07-10 PROCEDURE — 88305 TISSUE EXAM BY PATHOLOGIST: CPT | Mod: 26

## 2024-07-10 PROCEDURE — 77065 DX MAMMO INCL CAD UNI: CPT | Mod: 26,RT

## 2024-07-10 PROCEDURE — 88305 TISSUE EXAM BY PATHOLOGIST: CPT

## 2024-07-10 PROCEDURE — A4648: CPT

## 2024-07-11 LAB — SURGICAL PATHOLOGY STUDY: SIGNIFICANT CHANGE UP

## 2024-07-12 ENCOUNTER — OUTPATIENT (OUTPATIENT)
Dept: OUTPATIENT SERVICES | Facility: HOSPITAL | Age: 74
LOS: 1 days | End: 2024-07-12
Payer: MEDICARE

## 2024-07-12 ENCOUNTER — APPOINTMENT (OUTPATIENT)
Age: 74
End: 2024-07-12

## 2024-07-12 VITALS — HEART RATE: 84 BPM | TEMPERATURE: 98 F | DIASTOLIC BLOOD PRESSURE: 75 MMHG | SYSTOLIC BLOOD PRESSURE: 110 MMHG

## 2024-07-12 DIAGNOSIS — C82.90 FOLLICULAR LYMPHOMA, UNSPECIFIED, UNSPECIFIED SITE: ICD-10-CM

## 2024-07-12 DIAGNOSIS — N64.2 ATROPHY OF BREAST: ICD-10-CM

## 2024-07-12 DIAGNOSIS — N60.21 FIBROADENOSIS OF RIGHT BREAST: ICD-10-CM

## 2024-07-12 DIAGNOSIS — N63.10 UNSPECIFIED LUMP IN THE RIGHT BREAST, UNSPECIFIED QUADRANT: ICD-10-CM

## 2024-07-12 DIAGNOSIS — N60.81 OTHER BENIGN MAMMARY DYSPLASIAS OF RIGHT BREAST: ICD-10-CM

## 2024-07-12 DIAGNOSIS — C82.92: ICD-10-CM

## 2024-07-12 PROCEDURE — 96365 THER/PROPH/DIAG IV INF INIT: CPT

## 2024-07-12 RX ORDER — IRON SUCROSE 20 MG/ML
200 INJECTION, SOLUTION INTRAVENOUS ONCE
Refills: 0 | Status: COMPLETED | OUTPATIENT
Start: 2024-07-12 | End: 2024-07-12

## 2024-07-12 RX ADMIN — IRON SUCROSE 200 MILLIGRAM(S): 20 INJECTION, SOLUTION INTRAVENOUS at 11:25

## 2024-07-12 RX ADMIN — IRON SUCROSE 200 MILLIGRAM(S): 20 INJECTION, SOLUTION INTRAVENOUS at 11:55

## 2024-07-13 DIAGNOSIS — C82.92: ICD-10-CM

## 2024-07-19 ENCOUNTER — OUTPATIENT (OUTPATIENT)
Dept: OUTPATIENT SERVICES | Facility: HOSPITAL | Age: 74
LOS: 1 days | End: 2024-07-19
Payer: MEDICARE

## 2024-07-19 ENCOUNTER — APPOINTMENT (OUTPATIENT)
Age: 74
End: 2024-07-19

## 2024-07-19 VITALS — SYSTOLIC BLOOD PRESSURE: 115 MMHG | TEMPERATURE: 99 F | DIASTOLIC BLOOD PRESSURE: 80 MMHG | HEART RATE: 99 BPM

## 2024-07-19 DIAGNOSIS — C82.90 FOLLICULAR LYMPHOMA, UNSPECIFIED, UNSPECIFIED SITE: ICD-10-CM

## 2024-07-19 PROCEDURE — 96365 THER/PROPH/DIAG IV INF INIT: CPT

## 2024-07-19 RX ORDER — IRON SUCROSE 20 MG/ML
200 INJECTION, SOLUTION INTRAVENOUS ONCE
Refills: 0 | Status: COMPLETED | OUTPATIENT
Start: 2024-07-19 | End: 2024-07-19

## 2024-07-19 RX ADMIN — IRON SUCROSE 200 MILLIGRAM(S): 20 INJECTION, SOLUTION INTRAVENOUS at 11:23

## 2024-07-19 RX ADMIN — IRON SUCROSE 200 MILLIGRAM(S): 20 INJECTION, SOLUTION INTRAVENOUS at 10:53

## 2024-07-26 ENCOUNTER — OUTPATIENT (OUTPATIENT)
Dept: OUTPATIENT SERVICES | Facility: HOSPITAL | Age: 74
LOS: 1 days | End: 2024-07-26
Payer: MEDICARE

## 2024-07-26 ENCOUNTER — APPOINTMENT (OUTPATIENT)
Age: 74
End: 2024-07-26

## 2024-07-26 VITALS — SYSTOLIC BLOOD PRESSURE: 107 MMHG | HEART RATE: 71 BPM | DIASTOLIC BLOOD PRESSURE: 70 MMHG | TEMPERATURE: 98 F

## 2024-07-26 DIAGNOSIS — C82.90 FOLLICULAR LYMPHOMA, UNSPECIFIED, UNSPECIFIED SITE: ICD-10-CM

## 2024-07-26 PROCEDURE — 96365 THER/PROPH/DIAG IV INF INIT: CPT

## 2024-07-26 RX ORDER — IRON SUCROSE 20 MG/ML
200 INJECTION, SOLUTION INTRAVENOUS ONCE
Refills: 0 | Status: COMPLETED | OUTPATIENT
Start: 2024-07-26 | End: 2024-07-26

## 2024-07-26 RX ADMIN — IRON SUCROSE 200 MILLIGRAM(S): 20 INJECTION, SOLUTION INTRAVENOUS at 10:50

## 2024-07-26 RX ADMIN — IRON SUCROSE 200 MILLIGRAM(S): 20 INJECTION, SOLUTION INTRAVENOUS at 10:17

## 2024-08-01 ENCOUNTER — APPOINTMENT (OUTPATIENT)
Dept: BREAST CENTER | Facility: CLINIC | Age: 74
End: 2024-08-01
Payer: MEDICARE

## 2024-08-01 ENCOUNTER — OUTPATIENT (OUTPATIENT)
Dept: OUTPATIENT SERVICES | Facility: HOSPITAL | Age: 74
LOS: 1 days | End: 2024-08-01
Payer: MEDICARE

## 2024-08-01 ENCOUNTER — RESULT REVIEW (OUTPATIENT)
Age: 74
End: 2024-08-01

## 2024-08-01 DIAGNOSIS — Z00.8 ENCOUNTER FOR OTHER GENERAL EXAMINATION: ICD-10-CM

## 2024-08-01 DIAGNOSIS — N64.89 OTHER SPECIFIED DISORDERS OF BREAST: ICD-10-CM

## 2024-08-01 DIAGNOSIS — R92.8 OTHER ABNORMAL AND INCONCLUSIVE FINDINGS ON DIAGNOSTIC IMAGING OF BREAST: ICD-10-CM

## 2024-08-01 DIAGNOSIS — J84.9 INTERSTITIAL PULMONARY DISEASE, UNSPECIFIED: ICD-10-CM

## 2024-08-01 DIAGNOSIS — N60.49 MAMMARY DUCT ECTASIA OF UNSPECIFIED BREAST: ICD-10-CM

## 2024-08-01 PROCEDURE — 71250 CT THORAX DX C-: CPT | Mod: 26,MH

## 2024-08-01 PROCEDURE — 99204 OFFICE O/P NEW MOD 45 MIN: CPT

## 2024-08-01 PROCEDURE — 71250 CT THORAX DX C-: CPT

## 2024-08-01 NOTE — PHYSICAL EXAM
[Normocephalic] : normocephalic [EOMI] : extra ocular movement intact [No Supraclavicular Adenopathy] : no supraclavicular adenopathy [No Cervical Adenopathy] : no cervical adenopathy [Examined in the supine and seated position] : examined in the supine and seated position [No dominant masses] : no dominant masses in right breast  [No dominant masses] : no dominant masses left breast [No Nipple Retraction] : no left nipple retraction [No Nipple Discharge] : no left nipple discharge [No Axillary Lymphadenopathy] : no left axillary lymphadenopathy [No Rashes] : no rashes [No Ulceration] : no ulceration [de-identified] : NL respirations

## 2024-08-01 NOTE — HISTORY OF PRESENT ILLNESS
[FreeTextEntry1] : DANIELLE METCALF is a 74-year-old female patient who presents today for initial consultation for right breast radial scar.  fHx: maternal cousins and aunts with breast cancer. patient states she underwent genetic testing and is BRCA negative.  Her work-up is as follows: B/L Screening Mammo - 06/20/2024: -There are scattered areas of fibroglandular density. -There is an asymmetry seen in the lateral right breast. -There is an area of benign architectural distortion corresponding to the site of surgery seen in the left breast. BI-RADS Category 0:  Incomplete: Needs Additional Imaging Evaluation  Right Uni Dx Mammo & Sono - 06/26/2024: -There are scattered areas of fibroglandular density. -There is a persistent asymmetry in the lateral aspect of the right breast retroareolar region.  This likely corresponds to an indeterminate mass seen on concurrent ultrasound at the 8:00 position. Targeted unilateral right breast ultrasound was performed. -At the 8:00 position 1 cm from the nipple, there is a circumscribed hypoechoic mass measuring 0.3 x 0.3 x 0.2 cm, likely corresponding to mammographic findings and is indeterminate. Ultrasound-guided biopsy is recommended. -At the 9 to 10:00 position 2 cm from the nipple, there is a heterogeneous mass measuring 0.7 x 0.6 x 0.5 cm, indeterminate. Ultrasound-guided biopsy is recommended.  -There is an adjacent area of probable ductal ectasia measuring 0.3 x 0.3 x 0.3 cm for which short-term sonographic follow-up is recommended pending biopsy results. Recommendation: Ultrasound guided biopsy. BI-RADS Category 4: Suspicious  US Guided Core Bx - 07/10/2024: (site 1) Right, 8:00 N1, 0.3cm: (top-hat) - Benign Atrophic Fatty Breast Tissue Without Histopathologic Abnormality (site 2) Right, 9-10:00 N 2, 0.7cm: (stop-light) - RADIAL SCLEROSING LESION (RADIAL SCAR) ASSOCIATED WITH BOTH FLORID DUCT HYPERPLASIA AND APOCRINE METAPLASIA.  Site 1 findings are benign, concordant. Site 2 findings are benign high risk, concordant.  Denies any current complaints. No new changes to her breasts.

## 2024-08-01 NOTE — ASSESSMENT
[FreeTextEntry1] : Patient is a 74F with right breast radial scar.  She underwent bilateral scg mmg and subsequent right mmg/us in 6/2024 which showed right duct ectasia for 6 month follow up, right 8N1 3mm mass biopsied as benign tissue and right 9-10N2 7mm mass biopsied as radial scar (stoplight, concordant).  I had a long discussion with the patient. I reviewed the imaging and pathology findings. I explained a radial scar to the patient. I explained that the upgrade rate to carcinoma on excision is approximately 8%. I explained if upgraded, the majority of cases are in situ disease. Given the upgrade rate, excisional biopsy should be considered for most complex sclerosing lesions or radial scars. However, if the lesion is small and adequately sampled in the setting of pathology-imaging concordance, then observation can be considered.  I reviewed excision with the patient. The risks of excision include bleeding, infection, fluid collection, pain, numbness of the areas of the skin, surgical deformity of the breast or nipple-areolar complex, sensitivity of the breast, wound healing problems, discoloration of the wound edges, possible keloid formation and additional surgery, amongst other risks. I explained to the patient that if the surgical excision demonstrates cancer, the patient could require additional surgery, including re-excision for clear margins, or more extensive surgery, including sentinel lymph node biopsy. It is also possible to miss a lesion or have technical issues with the procedure, particularly those involving clips and wires. These could necessitate additional imaging studies or surgeries.  Patient understands and wishes to proceed with excision.  We also discussed that she would be for follow-up of her right breast likely ectasia vs bx. She wishes to follow up with imaging.  All questions and concerns were answered in detail.  Patient is for right breast needle localized lumpectomy.  She is for right us in 12/2024.  Total time spent on encounter was greater than 45 minutes , which included face to face time with the patient, performing an exam, reviewing previous medical records, reviewing current imaging/ pathology, documenting in patient record and coordinating care/imaging. Greater than 50% of the encounter was spent on counseling and coordination of her breast issue.

## 2024-08-01 NOTE — PHYSICAL EXAM
[Normocephalic] : normocephalic [EOMI] : extra ocular movement intact [No Supraclavicular Adenopathy] : no supraclavicular adenopathy [No Cervical Adenopathy] : no cervical adenopathy [Examined in the supine and seated position] : examined in the supine and seated position [No dominant masses] : no dominant masses in right breast  [No dominant masses] : no dominant masses left breast [No Nipple Retraction] : no left nipple retraction [No Nipple Discharge] : no left nipple discharge [No Axillary Lymphadenopathy] : no left axillary lymphadenopathy [No Rashes] : no rashes [No Ulceration] : no ulceration [de-identified] : NL respirations

## 2024-08-01 NOTE — DATA REVIEWED
[FreeTextEntry1] : B/L Screening Mammo - 06/20/2024: MAMMOGRAM FINDINGS: Mammography was performed including the following views: bilateral craniocaudal with tomosynthesis, bilateral mediolateral oblique with tomosynthesis.  The examination includes digital synthetic 2D and digital tomosynthesis 3D images. Additional imaging analysis was performed using CAD (computer-aided detection) software.  There are scattered areas of fibroglandular density.  Finding 1:  There is an asymmetry seen in the lateral right breast.  Finding 2:  There is an area of benign architectural distortion corresponding to the site of surgery seen in the left breast.  IMPRESSION: Finding 1:  Asymmetry in the right breast requires additional evaluation.  RECOMMENDATION: Patient will be recalled for additional mammographic views and breast ultrasound.  Finding 2:  Area of benign architectural distortion corresponding to the site of surgery and consistent with post operative fibrosis in the left breast is benign finding.  ASSESSMENT: BI-RADS Category 0:  Incomplete: Needs Additional Imaging Evaluation   Right Uni Dx Mammo & Sono - 06/26/2024: Breast composition:There are scattered areas of fibroglandular density.  Findings:  Mammogram:  There is a persistent asymmetry in the lateral aspect of the right breast retroareolar region. This likely corresponds to an indeterminate mass seen on concurrent ultrasound at the 8:00 position.  Ultrasound:  Targeted unilateral right breast ultrasound was performed.  At the 8:00 position 1 cm from the nipple, there is a circumscribed hypoechoic mass measuring 0.3 x 0.3 x 0.2 cm, likely corresponding to mammographic findings and is indeterminate. Ultrasound-guided biopsy is recommended. At the 9 to 10:00 position 2 cm from the nipple, there is a heterogeneous mass measuring 0.7 x 0.6 x 0.5 cm, indeterminate. Ultrasound-guided biopsy is recommended. There is an adjacent area of probable ductal ectasia measuring 0.3 x 0.3 x 0.3 cm for which short-term sonographic follow-up is recommended pending biopsy results.  Impression: Indeterminate right breast 8:00 and 9 to 10:00 position masses for which ultrasound-guided biopsy is recommended.  Breast Arterial Calcification (SARAH): Grade 3 - Coarse vascular or tram track calcification affecting 3 or more vessels per mammographic view. Note: A strong association between breast arterial calcification and cardiovascular disease has been reported. Correlation with other cardiovascular risk factors is recommended.   Recommendation: Ultrasound guided biopsy.  BI-RADS Category 4: Suspicious   US Guided Core Bx - 07/10/2024: (site 1) Right, 8:00 N1, 0.3cm: (top-hat) - Benign Atrophic Fatty Breast Tissue Without Histopathologic Abnormality (site 2) Right, 9-10:00 N 2, 0.7cm: (stop-light) - RADIAL SCLEROSING LESION (RADIAL SCAR) ASSOCIATED WITH BOTH FLORID DUCT HYPERPLASIA AND APOCRINE METAPLASIA.  Site 1 findings are benign, concordant. Site 2 findings are benign high risk, concordant.

## 2024-08-02 ENCOUNTER — OUTPATIENT (OUTPATIENT)
Dept: OUTPATIENT SERVICES | Facility: HOSPITAL | Age: 74
LOS: 1 days | End: 2024-08-02
Payer: MEDICARE

## 2024-08-02 ENCOUNTER — APPOINTMENT (OUTPATIENT)
Age: 74
End: 2024-08-02

## 2024-08-02 VITALS — TEMPERATURE: 98 F | SYSTOLIC BLOOD PRESSURE: 105 MMHG | DIASTOLIC BLOOD PRESSURE: 70 MMHG | HEART RATE: 73 BPM

## 2024-08-02 DIAGNOSIS — C82.92: ICD-10-CM

## 2024-08-02 DIAGNOSIS — J84.9 INTERSTITIAL PULMONARY DISEASE, UNSPECIFIED: ICD-10-CM

## 2024-08-02 PROCEDURE — 96365 THER/PROPH/DIAG IV INF INIT: CPT

## 2024-08-02 RX ORDER — IRON SUCROSE 20 MG/ML
200 INJECTION, SOLUTION INTRAVENOUS ONCE
Refills: 0 | Status: COMPLETED | OUTPATIENT
Start: 2024-08-02 | End: 2024-08-02

## 2024-08-02 RX ADMIN — IRON SUCROSE 200 MILLIGRAM(S): 20 INJECTION, SOLUTION INTRAVENOUS at 10:40

## 2024-08-02 RX ADMIN — IRON SUCROSE 200 MILLIGRAM(S): 20 INJECTION, SOLUTION INTRAVENOUS at 11:20

## 2024-08-03 DIAGNOSIS — C82.92: ICD-10-CM

## 2024-08-23 ENCOUNTER — OUTPATIENT (OUTPATIENT)
Dept: OUTPATIENT SERVICES | Facility: HOSPITAL | Age: 74
LOS: 1 days | End: 2024-08-23
Payer: MEDICARE

## 2024-08-23 ENCOUNTER — APPOINTMENT (OUTPATIENT)
Age: 74
End: 2024-08-23

## 2024-08-23 ENCOUNTER — LABORATORY RESULT (OUTPATIENT)
Age: 74
End: 2024-08-23

## 2024-08-23 DIAGNOSIS — C82.92: ICD-10-CM

## 2024-08-23 LAB
ALBUMIN SERPL ELPH-MCNC: 4.3 G/DL
ALP BLD-CCNC: 121 U/L
ALT SERPL-CCNC: 90 U/L
AST SERPL-CCNC: 39 U/L
BILIRUB DIRECT SERPL-MCNC: <0.2 MG/DL
BILIRUB INDIRECT SERPL-MCNC: >0.1 MG/DL
BILIRUB SERPL-MCNC: 0.3 MG/DL
HCT VFR BLD CALC: 42.9 %
HGB BLD-MCNC: 13.8 G/DL
MCHC RBC-ENTMCNC: 27.7 PG
MCHC RBC-ENTMCNC: 32.2 G/DL
MCV RBC AUTO: 86 FL
PLATELET # BLD AUTO: 273 K/UL
PMV BLD: 10.3 FL
PROT SERPL-MCNC: 7 G/DL
RBC # BLD: 4.99 M/UL
RBC # FLD: 17.7 %
WBC # FLD AUTO: 6.94 K/UL

## 2024-08-23 PROCEDURE — 82728 ASSAY OF FERRITIN: CPT

## 2024-08-23 PROCEDURE — 80048 BASIC METABOLIC PNL TOTAL CA: CPT

## 2024-08-23 PROCEDURE — 82607 VITAMIN B-12: CPT

## 2024-08-23 PROCEDURE — 83921 ORGANIC ACID SINGLE QUANT: CPT

## 2024-08-23 PROCEDURE — 82746 ASSAY OF FOLIC ACID SERUM: CPT

## 2024-08-23 PROCEDURE — 85027 COMPLETE CBC AUTOMATED: CPT

## 2024-08-23 PROCEDURE — 80076 HEPATIC FUNCTION PANEL: CPT

## 2024-08-23 PROCEDURE — 36415 COLL VENOUS BLD VENIPUNCTURE: CPT

## 2024-08-24 LAB
ANION GAP SERPL CALC-SCNC: 12 MMOL/L
BUN SERPL-MCNC: 19 MG/DL
CALCIUM SERPL-MCNC: 9.4 MG/DL
CHLORIDE SERPL-SCNC: 104 MMOL/L
CO2 SERPL-SCNC: 24 MMOL/L
CREAT SERPL-MCNC: 1 MG/DL
EGFR: 58 ML/MIN/1.73M2
FERRITIN SERPL-MCNC: 800 NG/ML
FOLATE SERPL-MCNC: 4.8 NG/ML
GLUCOSE SERPL-MCNC: 86 MG/DL
POTASSIUM SERPL-SCNC: 4.6 MMOL/L
SODIUM SERPL-SCNC: 140 MMOL/L
VIT B12 SERPL-MCNC: 648 PG/ML

## 2024-08-28 ENCOUNTER — APPOINTMENT (OUTPATIENT)
Age: 74
End: 2024-08-28
Payer: MEDICARE

## 2024-08-28 ENCOUNTER — OUTPATIENT (OUTPATIENT)
Dept: OUTPATIENT SERVICES | Facility: HOSPITAL | Age: 74
LOS: 1 days | End: 2024-08-28
Payer: MEDICARE

## 2024-08-28 VITALS
BODY MASS INDEX: 28.53 KG/M2 | WEIGHT: 159 LBS | OXYGEN SATURATION: 99 % | TEMPERATURE: 97.6 F | SYSTOLIC BLOOD PRESSURE: 112 MMHG | DIASTOLIC BLOOD PRESSURE: 77 MMHG | HEART RATE: 91 BPM | RESPIRATION RATE: 16 BRPM | HEIGHT: 62.6 IN

## 2024-08-28 DIAGNOSIS — C82.92: ICD-10-CM

## 2024-08-28 DIAGNOSIS — C82.90 FOLLICULAR LYMPHOMA, UNSPECIFIED, UNSPECIFIED SITE: ICD-10-CM

## 2024-08-28 DIAGNOSIS — N60.49 MAMMARY DUCT ECTASIA OF UNSPECIFIED BREAST: ICD-10-CM

## 2024-08-28 LAB
ALBUMIN SERPL ELPH-MCNC: 4.4 G/DL
ALP BLD-CCNC: 110 U/L
ALT SERPL-CCNC: 43 U/L
AST SERPL-CCNC: 26 U/L
BILIRUB DIRECT SERPL-MCNC: <0.2 MG/DL
BILIRUB INDIRECT SERPL-MCNC: >0.1 MG/DL
BILIRUB SERPL-MCNC: 0.3 MG/DL
GGT SERPL-CCNC: 31 U/L
METHYLMALONATE SERPL-SCNC: 116 NMOL/L
PROT SERPL-MCNC: 7.4 G/DL

## 2024-08-28 PROCEDURE — G2211 COMPLEX E/M VISIT ADD ON: CPT

## 2024-08-28 PROCEDURE — 99214 OFFICE O/P EST MOD 30 MIN: CPT

## 2024-08-28 PROCEDURE — 80076 HEPATIC FUNCTION PANEL: CPT

## 2024-08-28 PROCEDURE — 82977 ASSAY OF GGT: CPT

## 2024-08-28 NOTE — ASSESSMENT
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 - Follow up with dermatology, Dr. Leilani Sneed, as indicated - discussed w/ Dr Mauri Parks from hematopathology the impression of the latest bx (slides were not received); agreed that the impression indicates similar pathology as was present previously - s/p RT to left neck from 1/11/22 - 1/28/22 with Dr. Guerrero - Skin biopsy (Lt mid cheek) 1.29.2024 consistent with recurrence of B cell lymphoproliferative process - Discussed with patient and family that she now has recurrence of grade 1 follicular lymphoma (now 4 recurrence). Suggested referral to [facility] to evaluate if patient is candidate of RT (last RT was 1.2022). If RT would not be an option, will discuss treatment with Rituximab weekly x4.  - followup with [facility], Dr. Wynne, as scheduled for re-RT for recurrence w/ 30Gy/12fx - PET/CT 2.21.2024 shows nonspecific mild FDG uptake in right and left jacob, right paratracheal subcentimeter node and subcentimeter subcarinal density which may be inflammatory.    completed RT to left cheek 12F X 3000cGy 3/19-4/3/24 with resolution  c/o weakness ; has CASS; will start venofer 1000mg and she should see GI to evaluate for panendoscopy  8/28/24 1. breast pahtology reviewed; and pt is f/b breast team 2. CASS s/p venofer; needs GI f/u 3. follicular lymphoma s/ p RT, f/b [facility] 4. sob f/ b pulmonary and cardio

## 2024-08-28 NOTE — REVIEW OF SYSTEMS
[Fatigue] : fatigue [Skin Rash] : skin rash [Negative] : Allergic/Immunologic [Fever] : no fever [Chills] : no chills [Night Sweats] : no night sweats [Recent Change In Weight] : ~T no recent weight change [de-identified] : reports L neck localized rash

## 2024-08-28 NOTE — CONSULT LETTER
[Dear  ___] : Dear  [unfilled], [Consult Letter:] : I had the pleasure of evaluating your patient, [unfilled]. [Please see my note below.] : Please see my note below. [Consult Closing:] : Thank you very much for allowing me to participate in the care of this patient.  If you have any questions, please do not hesitate to contact me. [Sincerely,] : Sincerely, [FreeTextEntry3] : Alan Lyons DO Attending Physician, Hematology/ Medical Oncology [phone] office

## 2024-08-28 NOTE — PHYSICAL EXAM
[Fully active, able to carry on all pre-disease performance without restriction] : Status 0 - Fully active, able to carry on all pre-disease performance without restriction [Normal] : affect appropriate [de-identified] : left pre auricular + neck site : 2 areas of redness , no palpable raised masses or lesions

## 2024-08-28 NOTE — HISTORY OF PRESENT ILLNESS
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 Each time treated with RT. \par  \par  Biopsy on 12.5.18: left preauricular area: limited findings; can not assess the depth; differential includes pseudolymphoma or evolving lymphoproliferative disorder\par  \par  PMH: follicular lymphoma stage IE\par  FH: colon cancer in father and brother; lymphohistocytosis in mother\par  SH; no smoking [de-identified] : 1.25.19: biopsy: no lymphoma   PET/CT 1/30/19  IMPRESSION: Since 9/25/2017: 1. No definite sites of pathologic FDG uptake. 2. New 0.9 cm right anterior lower lobe linear nodular opacity, max 3.0 SUV, by pattern likely represents focal atelectasis. Chest CT in 3 months can be performed to assess for stability. 3. Persistent mild FDG uptake within subcentimeter bilateral hilar lymph nodes; nonspecific. 4. Mild diffuse uptake within the stomach, correlate for gastritis (max 6.3 SUV).   10/8/2019 : The patient is here for follow up. She had a skin biopsy of left periauricular region in August 2019, which was negative for lymphoma . No RT needed . Since last visit, she has no new complaints, no fever, no weight loss, no palpable masses or lymph nodes.   DANIELLE METCALF                      2    Addendum Report     Addendum Additional levels are examined on part 2 which show scant epidermis with solar elastosis.  Verified by: Jg Alicea M.D. (Electronic Signature) Reported on: 04/29/19 15:52 EDT, [address] [place] [address] _________________________________________________________________   Surgical Final Report     Final Diagnosis 1. Skin, left facial lateral, biopsy: - Actinic keratosis.  2. Skin, left facial, biopsy (#2): - Fibroadipose tissue, insufficient for diagnosis. - Additional levels pending.  3. Skin, left facial, biopsy (#3): - Actinic keratosis.  4. Skin, left facial medial, biopsy: - Actinic keratosis.  Verified by: Jg Alicea M.D. (Electronic Signature) Reported on: 04/25/19 18:04 EDT, [address] [place] [address] _________________________________________________________________  Comment This case was reviewed in the department and the diagnosis represents a consensus opinion.  Clinical History Punch biopsy of left facial skin  Specimen(s) Submitted 1     Punch biopsy left facial skin lateral 2     Punch biopsy left facial skin 3     Punch biopsy left facial skin      DANIELLE METCALF                      2    Surgical Final Report     4     Punch biopsy left facial skin medial  Gross Description 1. The specimen is received in formalin, and labeled "punch biopsy of the lateral" and consists of a punch biopsy of tan skin measuring 0.1 cm in diameter x 0.2 cm in depth.  The specimen is submitted entirely. (1 block)  2. The specimen is received in formalin, and labeled "punch biopsy of periphery" and consists of a punch biopsy of tan skin measuring 0.1 cm in diameter x 0.2 cm in depth.  The specimen is submitted entirely. (1 block)  3. The specimen is received in formalin, and labeled "punch biopsy #3" and consists of a punch biopsy of tan skin measuring 0.1 cm in diameter x 0.5 cm in depth.  The specimen is submitted entirely. (1 block)  4. The specimen is received in formalin, and labeled "punch biopsy from medial"and consists of a punch biopsy of tan skin measuring 0.1 cm in diameter x 0.3 cm in depth.  The specimen is submitted entirely. (1 block)  11/30/21 Patient is here for a follow-up visit, accompanied by daughter in law.  Patient denies fever, chills, night sweats, unintentional weight loss or bleeding.  She does report weakness and occasional dizziness, worse over the last 6 months or so.  She also reports hearing issues over the last year.  She underwent biopsy with DERM about 2 weeks ago.  Reviewed pathology results which are non-specific.   PATH (11.10.2021 - DERMPATH DIAGNOSTICS) INTERPRETATION:  PCR analysis of the immunoglobulin heavy and kappa chain (IGH, IGK) gene rearrangements was performed by multiplex PCR followed by capillary electrophoresis.  A positive result indicates the presence of a clonal B-cell expansion, except with immature lymphoid neoplasms where these rearrangement can be seen in other lineages.  The diagnostic sensitivity of this assay depends on the number of background B-cells in the samples, varyingfrom 1-10% clonal B-cells in a polyclonal B-cell background.  The reference range for B-cell gene rearrangments in non0-neoplastic tissues is negative.    PET/CT 12/2021 Since January 30, 2019, persistent mild FDG uptake in bilateral hilar  lymph nodes, nonspecific (max SUV 6 in a right hilar node, 20% increase).  2.  Persistent mild diffuse FDG uptake in the stomach, nonspecific and  possibly inflammatory (max SUV 6.6). Further evaluation with an endoscopy  may be warranted, if not yet performed  4/5/22 Patient is here for a follow-up visit for follicular lymphoma.  Since last visit, she completed RT to left neck from 1/11/22 - 1/28/22 with Dr. Guerrero.  She reports fatigue from RT.  Patient denies fever, chills, night sweats, unintentional weight loss or bleeding.    3/14/24 Patient is here for a follow-up visit for follicular lymphoma.  [facility] last [facility] completed RT to left neck from 1/11/22 - 1/28/22 with Dr. Guerrero.  Patient denies fever, chills, night sweats, unintentional weight loss or bleeding.  Reviewed most recent PET/CT which shows no definite sites of pathologic FDG uptake; there is nonspecific mild FDG uptake in right and left jacob, right paratracheal subcentimeter node and subcentimeter subcarinal density which may be inflammatory.   PET/CT (2.21.2024) IMPRESSION:Compared to 12/09/2021, no definite sites of pathologic FDG uptake clearly representing biologic tumor activity and therefore no definite new sites of abnormal FDG uptake.Stable nonspecific mild FDG uptake in right and left jacob (SUV 4.9 vs 6) right paratracheal subcentimeter node (SUV 4.9 vs 4.5) and subcentimeter subcarinal density (SUV 4 vs 5.2 ). These are nonspecific and may represent inflammatory process based on relative stability over the past 2 years.Specifically no abnormal FDG uptake in the region of the left cheek-the region of the recently biopsied skin lesion.

## 2024-09-05 ENCOUNTER — APPOINTMENT (OUTPATIENT)
Dept: CARDIOLOGY | Facility: CLINIC | Age: 74
End: 2024-09-05

## 2024-09-20 ENCOUNTER — APPOINTMENT (OUTPATIENT)
Dept: PULMONOLOGY | Facility: CLINIC | Age: 74
End: 2024-09-20
Payer: MEDICARE

## 2024-09-20 ENCOUNTER — APPOINTMENT (OUTPATIENT)
Age: 74
End: 2024-09-20

## 2024-09-20 VITALS
BODY MASS INDEX: 25.48 KG/M2 | SYSTOLIC BLOOD PRESSURE: 108 MMHG | WEIGHT: 142 LBS | OXYGEN SATURATION: 99 % | RESPIRATION RATE: 20 BRPM | HEART RATE: 105 BPM | DIASTOLIC BLOOD PRESSURE: 64 MMHG

## 2024-09-20 DIAGNOSIS — R06.02 SHORTNESS OF BREATH: ICD-10-CM

## 2024-09-20 DIAGNOSIS — J45.20 MILD INTERMITTENT ASTHMA, UNCOMPLICATED: ICD-10-CM

## 2024-09-20 PROCEDURE — 99214 OFFICE O/P EST MOD 30 MIN: CPT

## 2024-09-20 NOTE — ASSESSMENT
[FreeTextEntry1] : Dyspnea on exertion.  possibly late onset HAAD  Did not improve with Breo HO Lymphoma following  wiht Hem onc  SP PET 2-24 SP cardiac work up

## 2024-09-20 NOTE — PHYSICAL EXAM
[No Acute Distress] : no acute distress [Normal Oropharynx] : normal oropharynx [Normal Appearance] : normal appearance [No Neck Mass] : no neck mass [Normal Rate/Rhythm] : normal rate/rhythm [Normal S1, S2] : normal s1, s2 [No Murmurs] : no murmurs [No Resp Distress] : no resp distress [No Abnormalities] : no abnormalities [Benign] : benign [Normal Gait] : normal gait [No Clubbing] : no clubbing [No Edema] : no edema [No Focal Deficits] : no focal deficits [Oriented x3] : oriented x3 [Normal Affect] : normal affect [TextBox_68] : Decreased air entry bilaterally.

## 2024-09-30 ENCOUNTER — APPOINTMENT (OUTPATIENT)
Dept: PULMONOLOGY | Facility: HOSPITAL | Age: 74
End: 2024-09-30

## 2024-10-11 ENCOUNTER — OUTPATIENT (OUTPATIENT)
Dept: OUTPATIENT SERVICES | Facility: HOSPITAL | Age: 74
LOS: 1 days | End: 2024-10-11
Payer: MEDICARE

## 2024-10-11 VITALS
OXYGEN SATURATION: 96 % | HEIGHT: 62 IN | TEMPERATURE: 98 F | DIASTOLIC BLOOD PRESSURE: 82 MMHG | SYSTOLIC BLOOD PRESSURE: 107 MMHG | RESPIRATION RATE: 16 BRPM | WEIGHT: 145.95 LBS | HEART RATE: 96 BPM

## 2024-10-11 DIAGNOSIS — Z98.891 HISTORY OF UTERINE SCAR FROM PREVIOUS SURGERY: Chronic | ICD-10-CM

## 2024-10-11 DIAGNOSIS — Z86.018 PERSONAL HISTORY OF OTHER BENIGN NEOPLASM: Chronic | ICD-10-CM

## 2024-10-11 DIAGNOSIS — N64.89 OTHER SPECIFIED DISORDERS OF BREAST: ICD-10-CM

## 2024-10-11 DIAGNOSIS — Z01.818 ENCOUNTER FOR OTHER PREPROCEDURAL EXAMINATION: ICD-10-CM

## 2024-10-11 LAB
ALBUMIN SERPL ELPH-MCNC: 4.1 G/DL — SIGNIFICANT CHANGE UP (ref 3.5–5.2)
ALP SERPL-CCNC: 94 U/L — SIGNIFICANT CHANGE UP (ref 30–115)
ALT FLD-CCNC: 34 U/L — SIGNIFICANT CHANGE UP (ref 0–41)
ANION GAP SERPL CALC-SCNC: 14 MMOL/L — SIGNIFICANT CHANGE UP (ref 7–14)
AST SERPL-CCNC: 24 U/L — SIGNIFICANT CHANGE UP (ref 0–41)
BILIRUB SERPL-MCNC: 0.3 MG/DL — SIGNIFICANT CHANGE UP (ref 0.2–1.2)
BUN SERPL-MCNC: 12 MG/DL — SIGNIFICANT CHANGE UP (ref 10–20)
CALCIUM SERPL-MCNC: 9.7 MG/DL — SIGNIFICANT CHANGE UP (ref 8.4–10.5)
CHLORIDE SERPL-SCNC: 104 MMOL/L — SIGNIFICANT CHANGE UP (ref 98–110)
CO2 SERPL-SCNC: 27 MMOL/L — SIGNIFICANT CHANGE UP (ref 17–32)
CREAT SERPL-MCNC: 1 MG/DL — SIGNIFICANT CHANGE UP (ref 0.7–1.5)
EGFR: 58 ML/MIN/1.73M2 — LOW
GLUCOSE SERPL-MCNC: 128 MG/DL — HIGH (ref 70–99)
POTASSIUM SERPL-MCNC: 4.1 MMOL/L — SIGNIFICANT CHANGE UP (ref 3.5–5)
POTASSIUM SERPL-SCNC: 4.1 MMOL/L — SIGNIFICANT CHANGE UP (ref 3.5–5)
PROT SERPL-MCNC: 7.1 G/DL — SIGNIFICANT CHANGE UP (ref 6–8)
SODIUM SERPL-SCNC: 145 MMOL/L — SIGNIFICANT CHANGE UP (ref 135–146)

## 2024-10-11 PROCEDURE — 99214 OFFICE O/P EST MOD 30 MIN: CPT | Mod: 25

## 2024-10-11 PROCEDURE — 93010 ELECTROCARDIOGRAM REPORT: CPT

## 2024-10-11 PROCEDURE — 36415 COLL VENOUS BLD VENIPUNCTURE: CPT

## 2024-10-11 PROCEDURE — 80053 COMPREHEN METABOLIC PANEL: CPT

## 2024-10-11 PROCEDURE — 93005 ELECTROCARDIOGRAM TRACING: CPT

## 2024-10-11 NOTE — H&P PST ADULT - NSANTHOSAYNRD_GEN_A_CORE
No. APXTON screening performed.  STOP BANG Legend: 0-2 = LOW Risk; 3-4 = INTERMEDIATE Risk; 5-8 = HIGH Risk

## 2024-10-11 NOTE — H&P PST ADULT - REASON FOR ADMISSION
Case Type: OP Block TimeSuite: CASProceduralist: Sofia Conn  Confirmed Surgery DateTime: 10- - 0:00PAST DateTime: 10- - 10:00Procedure: right breast lumpectomy right breast needle localization  ERP?: NoLaterality: N/ALength of Procedure: 60 Minutes  Anesthesia Type: Local Standby

## 2024-10-11 NOTE — H&P PST ADULT - HISTORY OF PRESENT ILLNESS
Patient is a 74 year old  female presenting to PAST in preparation for right breast lumpectomy right breast needle localization  on  10- under Local Standby anesthesia by Dr. Sofia Conn .    Patient reports that she found out she has a lump in her right breast via mammogram.  Denies any changes to right breast and pain.    PATIENT  CURRENTLY DENIES CHEST PAIN  SHORTNESS OF BREATH  PALPITATIONS,  DYSURIA, OR UPPER RESPIRATORY INFECTION IN PAST 2 WEEKS    denies travel outside the [place] in the past 30 days    Anesthesia Alert  NO--Difficult Airway CLASS 1 AIRWAY  NO--History of neck surgery or radiation  NO--Limited ROM of neck  NO--History of Malignant hyperthermia  NO--No personal or family history of Pseudocholinesterase deficiency.  NO--Prior Anesthesia Complication  NO--Latex Allergy  NO--Loose teeth  NO--History of Rheumatoid Arthritis  NO--Bleeding risk  NO--PAXTON  NO--Other_____    PT  DENIES ANY RASHES, ABRASION, OR OPEN WOUNDS OR CUTS    AS PER THE PATIENT, THIS IS HIS/HER COMPLETE MEDICAL AND SURGICAL HX, INCLUDING MEDICATIONS PRESCRIBED AND OVER THE COUNTER    Patient communicated understanding of instructions and was given the opportunity to ask questions and have them answered.    pt denies any suicidal ideation or thoughts, pt states not a threat to self or others    Revised Cardiac Risk Index for Pre-Operative Risk from [email or web address]  on 10/11/2024  ** All calculations should be rechecked by clinician prior to use **    RESULT SUMMARY:  0 points  Class I Risk    3.9 %  30-day risk of death, MI, or cardiac arrest    From Duceppe 2017. These numbers are higher than those from the original study (Steve 1999). See Evidence for details.      INPUTS:  Elevated-risk surgery —> 0 = No  History of ischemic heart disease —> 0 = No  History of congestive heart failure —> 0 = No  History of cerebrovascular disease —> 0 = No  Pre-operative treatment with insulin —> 0 = No  Pre-operative creatinine >2 mg/dL / 176.8 µmol/L —> 0 = No    Duke Activity Status Index (DASI) from [email or web address]  on 10/11/2024  ** All calculations should be rechecked by clinician prior to use **    RESULT SUMMARY:  58.2 points  The higher the score (maximum 58.2), the higher the functional status.    9.89 METs        INPUTS:  Take care of self —> 2.75 = Yes  Walk indoors —> 1.75 = Yes  Walk 1&ndash;2 blocks on level ground —> 2.75 = Yes  Climb a flight of stairs or walk up a hill —> 5.5 = Yes  Run a short distance —> 8 = Yes  Do light work around the house —> 2.7 = Yes  Do moderate work around the house —> 3.5 = Yes  Do heavy work around the house —> 8 = Yes  Do yardwork —> 4.5 = Yes  Have sexual relations —> 5.25 = Yes  Participate in moderate recreational activities —> 6 = Yes  Participate in strenuous sports —> 7.5 = Yes   Patient is a 74 year old  female presenting to PAST in preparation for right breast lumpectomy right breast needle localization, tissue transfer on  10- under Local Standby anesthesia by Dr. Sofia Conn .    Patient reports that she found out she has a lump in her right breast via mammogram.  Denies any changes to right breast and pain.    PATIENT  CURRENTLY DENIES CHEST PAIN  SHORTNESS OF BREATH  PALPITATIONS,  DYSURIA, OR UPPER RESPIRATORY INFECTION IN PAST 2 WEEKS    denies travel outside the [place] in the past 30 days    Anesthesia Alert  NO--Difficult Airway CLASS 1 AIRWAY  NO--History of neck surgery or radiation  NO--Limited ROM of neck  NO--History of Malignant hyperthermia  NO--No personal or family history of Pseudocholinesterase deficiency.  NO--Prior Anesthesia Complication  NO--Latex Allergy  NO--Loose teeth  NO--History of Rheumatoid Arthritis  NO--Bleeding risk  NO--PAXTON  NO--Other_____    PT  DENIES ANY RASHES, ABRASION, OR OPEN WOUNDS OR CUTS    AS PER THE PATIENT, THIS IS HIS/HER COMPLETE MEDICAL AND SURGICAL HX, INCLUDING MEDICATIONS PRESCRIBED AND OVER THE COUNTER    Patient communicated understanding of instructions and was given the opportunity to ask questions and have them answered.    pt denies any suicidal ideation or thoughts, pt states not a threat to self or others    Revised Cardiac Risk Index for Pre-Operative Risk from [email or web address]  on 10/11/2024  ** All calculations should be rechecked by clinician prior to use **    RESULT SUMMARY:  0 points  Class I Risk    3.9 %  30-day risk of death, MI, or cardiac arrest    From Duceppe 2017. These numbers are higher than those from the original study (Steve 1999). See Evidence for details.      INPUTS:  Elevated-risk surgery —> 0 = No  History of ischemic heart disease —> 0 = No  History of congestive heart failure —> 0 = No  History of cerebrovascular disease —> 0 = No  Pre-operative treatment with insulin —> 0 = No  Pre-operative creatinine >2 mg/dL / 176.8 µmol/L —> 0 = No    Duke Activity Status Index (DASI) from [email or web address]  on 10/11/2024  ** All calculations should be rechecked by clinician prior to use **    RESULT SUMMARY:  58.2 points  The higher the score (maximum 58.2), the higher the functional status.    9.89 METs        INPUTS:  Take care of self —> 2.75 = Yes  Walk indoors —> 1.75 = Yes  Walk 1&ndash;2 blocks on level ground —> 2.75 = Yes  Climb a flight of stairs or walk up a hill —> 5.5 = Yes  Run a short distance —> 8 = Yes  Do light work around the house —> 2.7 = Yes  Do moderate work around the house —> 3.5 = Yes  Do heavy work around the house —> 8 = Yes  Do yardwork —> 4.5 = Yes  Have sexual relations —> 5.25 = Yes  Participate in moderate recreational activities —> 6 = Yes  Participate in strenuous sports —> 7.5 = Yes

## 2024-10-11 NOTE — H&P PST ADULT - NSICDXPASTSURGICALHX_GEN_ALL_CORE_FT
PAST SURGICAL HISTORY:  H/O  section     History of appendectomy     History of uterine fibroid

## 2024-10-11 NOTE — H&P PST ADULT - NSICDXPASTMEDICALHX_GEN_ALL_CORE_FT
PAST MEDICAL HISTORY:  Follicular lymphoma     GERD (gastroesophageal reflux disease)     Migraines     Osteoporosis     S/P radiation therapy

## 2024-10-12 DIAGNOSIS — Z01.818 ENCOUNTER FOR OTHER PREPROCEDURAL EXAMINATION: ICD-10-CM

## 2024-10-12 DIAGNOSIS — N64.89 OTHER SPECIFIED DISORDERS OF BREAST: ICD-10-CM

## 2024-10-15 ENCOUNTER — APPOINTMENT (OUTPATIENT)
Dept: PULMONOLOGY | Facility: HOSPITAL | Age: 74
End: 2024-10-15

## 2024-10-15 ENCOUNTER — OUTPATIENT (OUTPATIENT)
Dept: OUTPATIENT SERVICES | Facility: HOSPITAL | Age: 74
LOS: 1 days | End: 2024-10-15
Payer: MEDICARE

## 2024-10-15 DIAGNOSIS — Z86.018 PERSONAL HISTORY OF OTHER BENIGN NEOPLASM: Chronic | ICD-10-CM

## 2024-10-15 DIAGNOSIS — Z98.891 HISTORY OF UTERINE SCAR FROM PREVIOUS SURGERY: Chronic | ICD-10-CM

## 2024-10-15 DIAGNOSIS — R06.02 SHORTNESS OF BREATH: ICD-10-CM

## 2024-10-15 PROCEDURE — 95070 INHLJ BRNCL CHALLENGE TSTG: CPT

## 2024-10-16 ENCOUNTER — OUTPATIENT (OUTPATIENT)
Dept: OUTPATIENT SERVICES | Facility: HOSPITAL | Age: 74
LOS: 1 days | End: 2024-10-16

## 2024-10-16 DIAGNOSIS — R06.02 SHORTNESS OF BREATH: ICD-10-CM

## 2024-10-16 DIAGNOSIS — Z00.8 ENCOUNTER FOR OTHER GENERAL EXAMINATION: ICD-10-CM

## 2024-10-16 DIAGNOSIS — Z86.018 PERSONAL HISTORY OF OTHER BENIGN NEOPLASM: Chronic | ICD-10-CM

## 2024-10-16 DIAGNOSIS — Z98.891 HISTORY OF UTERINE SCAR FROM PREVIOUS SURGERY: Chronic | ICD-10-CM

## 2024-10-16 PROBLEM — M81.0 AGE-RELATED OSTEOPOROSIS WITHOUT CURRENT PATHOLOGICAL FRACTURE: Chronic | Status: ACTIVE | Noted: 2024-10-11

## 2024-10-16 PROBLEM — K21.9 GASTRO-ESOPHAGEAL REFLUX DISEASE WITHOUT ESOPHAGITIS: Chronic | Status: ACTIVE | Noted: 2024-10-11

## 2024-10-16 PROBLEM — C82.90 FOLLICULAR LYMPHOMA, UNSPECIFIED, UNSPECIFIED SITE: Chronic | Status: ACTIVE | Noted: 2024-10-11

## 2024-10-16 PROBLEM — G43.909 MIGRAINE, UNSPECIFIED, NOT INTRACTABLE, WITHOUT STATUS MIGRAINOSUS: Chronic | Status: ACTIVE | Noted: 2024-10-11

## 2024-10-16 PROBLEM — Z92.3 PERSONAL HISTORY OF IRRADIATION: Chronic | Status: ACTIVE | Noted: 2024-10-11

## 2024-10-17 DIAGNOSIS — Z00.8 ENCOUNTER FOR OTHER GENERAL EXAMINATION: ICD-10-CM

## 2024-10-22 NOTE — ASU PATIENT PROFILE, ADULT - FALL HARM RISK - UNIVERSAL INTERVENTIONS
Bed in lowest position, wheels locked, appropriate side rails in place/Call bell, personal items and telephone in reach/Instruct patient to call for assistance before getting out of bed or chair/Non-slip footwear when patient is out of bed/Upper Tract to call system/Physically safe environment - no spills, clutter or unnecessary equipment/Purposeful Proactive Rounding/Room/bathroom lighting operational, light cord in reach

## 2024-10-23 ENCOUNTER — RESULT REVIEW (OUTPATIENT)
Age: 74
End: 2024-10-23

## 2024-10-23 ENCOUNTER — APPOINTMENT (OUTPATIENT)
Dept: BREAST CENTER | Facility: AMBULATORY SURGERY CENTER | Age: 74
End: 2024-10-23
Payer: MEDICARE

## 2024-10-23 ENCOUNTER — OUTPATIENT (OUTPATIENT)
Dept: OUTPATIENT SERVICES | Facility: HOSPITAL | Age: 74
LOS: 1 days | Discharge: HOME | End: 2024-10-23
Payer: MEDICARE

## 2024-10-23 VITALS
HEART RATE: 80 BPM | DIASTOLIC BLOOD PRESSURE: 64 MMHG | SYSTOLIC BLOOD PRESSURE: 133 MMHG | RESPIRATION RATE: 16 BRPM | OXYGEN SATURATION: 97 % | TEMPERATURE: 98 F

## 2024-10-23 VITALS
HEART RATE: 86 BPM | RESPIRATION RATE: 18 BRPM | TEMPERATURE: 98 F | DIASTOLIC BLOOD PRESSURE: 77 MMHG | HEIGHT: 62 IN | OXYGEN SATURATION: 96 % | SYSTOLIC BLOOD PRESSURE: 124 MMHG | WEIGHT: 145.95 LBS

## 2024-10-23 DIAGNOSIS — K21.9 GASTRO-ESOPHAGEAL REFLUX DISEASE WITHOUT ESOPHAGITIS: ICD-10-CM

## 2024-10-23 DIAGNOSIS — N63.10 UNSPECIFIED LUMP IN THE RIGHT BREAST, UNSPECIFIED QUADRANT: ICD-10-CM

## 2024-10-23 DIAGNOSIS — M81.0 AGE-RELATED OSTEOPOROSIS WITHOUT CURRENT PATHOLOGICAL FRACTURE: ICD-10-CM

## 2024-10-23 DIAGNOSIS — D24.1 BENIGN NEOPLASM OF RIGHT BREAST: ICD-10-CM

## 2024-10-23 DIAGNOSIS — N60.81 OTHER BENIGN MAMMARY DYSPLASIAS OF RIGHT BREAST: ICD-10-CM

## 2024-10-23 DIAGNOSIS — N64.89 OTHER SPECIFIED DISORDERS OF BREAST: ICD-10-CM

## 2024-10-23 DIAGNOSIS — R92.0 MAMMOGRAPHIC MICROCALCIFICATION FOUND ON DIAGNOSTIC IMAGING OF BREAST: ICD-10-CM

## 2024-10-23 DIAGNOSIS — N64.2 ATROPHY OF BREAST: ICD-10-CM

## 2024-10-23 DIAGNOSIS — Z85.72 PERSONAL HISTORY OF NON-HODGKIN LYMPHOMAS: ICD-10-CM

## 2024-10-23 DIAGNOSIS — Z98.891 HISTORY OF UTERINE SCAR FROM PREVIOUS SURGERY: Chronic | ICD-10-CM

## 2024-10-23 DIAGNOSIS — Z86.018 PERSONAL HISTORY OF OTHER BENIGN NEOPLASM: Chronic | ICD-10-CM

## 2024-10-23 DIAGNOSIS — G43.909 MIGRAINE, UNSPECIFIED, NOT INTRACTABLE, WITHOUT STATUS MIGRAINOSUS: ICD-10-CM

## 2024-10-23 DIAGNOSIS — N60.41 MAMMARY DUCT ECTASIA OF RIGHT BREAST: ICD-10-CM

## 2024-10-23 PROCEDURE — 19281 PERQ DEVICE BREAST 1ST IMAG: CPT | Mod: RT

## 2024-10-23 PROCEDURE — C1889: CPT

## 2024-10-23 PROCEDURE — 19125 EXCISION BREAST LESION: CPT | Mod: RT

## 2024-10-23 PROCEDURE — 88305 TISSUE EXAM BY PATHOLOGIST: CPT

## 2024-10-23 PROCEDURE — 88305 TISSUE EXAM BY PATHOLOGIST: CPT | Mod: 26

## 2024-10-23 RX ORDER — DEXLANSOPRAZOLE 60 MG
1 CAPSULE, DELAYED RELEASE, BIPHASIC ORAL
Refills: 0 | DISCHARGE

## 2024-10-23 RX ORDER — ERENUMAB-AOOE 70 MG/ML
70 INJECTION SUBCUTANEOUS
Refills: 0 | DISCHARGE

## 2024-10-23 RX ORDER — SODIUM CHLORIDE IRRIG SOLUTION 0.9 %
1000 SOLUTION, IRRIGATION IRRIGATION
Refills: 0 | Status: DISCONTINUED | OUTPATIENT
Start: 2024-10-23 | End: 2024-10-23

## 2024-10-23 RX ORDER — OXYCODONE AND ACETAMINOPHEN 5; 325 MG/1; MG/1
1 TABLET ORAL ONCE
Refills: 0 | Status: DISCONTINUED | OUTPATIENT
Start: 2024-10-23 | End: 2024-10-23

## 2024-10-23 RX ORDER — ONDANSETRON HCL/PF 4 MG/2 ML
4 VIAL (ML) INJECTION ONCE
Refills: 0 | Status: DISCONTINUED | OUTPATIENT
Start: 2024-10-23 | End: 2024-10-23

## 2024-10-23 RX ORDER — IBANDRONATE SODIUM 150 MG/1
0 TABLET ORAL
Refills: 0 | DISCHARGE

## 2024-10-23 RX ORDER — HYDROMORPHONE HYDROCHLORIDE 1 MG/ML
0.5 INJECTION, SOLUTION INTRAMUSCULAR; INTRAVENOUS; SUBCUTANEOUS
Refills: 0 | Status: DISCONTINUED | OUTPATIENT
Start: 2024-10-23 | End: 2024-10-23

## 2024-10-23 RX ADMIN — Medication 100 MILLILITER(S): at 10:09

## 2024-10-23 NOTE — ASU DISCHARGE PLAN (ADULT/PEDIATRIC) - NS MD DC FALL RISK RISK
For information on Fall & Injury Prevention, visit: [email or web address] OR  [email or web address] OR  https://www.cdc.gov/steadi/patient.html

## 2024-10-23 NOTE — ASU DISCHARGE PLAN (ADULT/PEDIATRIC) - CARE PROVIDER_API CALL
Sofia Conn  Surgery  [address] [place] [address], [address] [place] [address]  Phone: [phone]  Fax: [phone]  Follow Up Time: 2 weeks

## 2024-10-23 NOTE — PRE-ANESTHESIA EVALUATION ADULT - NSANTHOSAYNRD_GEN_A_CORE
No. PAXTON screening performed.  STOP BANG Legend: 0-2 = LOW Risk; 3-4 = INTERMEDIATE Risk; 5-8 = HIGH Risk

## 2024-10-23 NOTE — ASU DISCHARGE PLAN (ADULT/PEDIATRIC) - FINANCIAL ASSISTANCE
[facility] provides services at a reduced cost to those who are determined to be eligible through [facility]’s financial assistance program. Information regarding [facility]’s financial assistance program can be found by going to [email or web address] or by calling [phone].

## 2024-10-23 NOTE — PRE-ANESTHESIA EVALUATION ADULT - ANESTHESIA, PREVIOUS REACTION, PROFILE
none Anxiety disorder    Asthma    Back pain    Bipolar 1 disorder    Bipolar disorder    Depression    No pertinent past medical history    Ovarian cyst    Ovarian cyst 2

## 2024-10-23 NOTE — CHART NOTE - NSCHARTNOTEFT_GEN_A_CORE
PACU ANESTHESIA ADMISSION NOTE      Procedure: right breast needle localized lumpectomy   Post op diagnosis:      ____  Intubated  TV:______       Rate: ______      FiO2: ______    __x__  Patent Airway    __x__  Full return of protective reflexes    _x___  Full recovery from anesthesia / back to baseline     Vitals:   T:  97.6         R: 14                 BP: 115/60                 Sat: 96%                  P: 91      Mental Status:  __x__ Awake   __x___ Alert   _____ Drowsy   _____ Sedated    Nausea/Vomiting:  _x___ NO  ______Yes,   See Post - Op Orders          Pain Scale (0-10):  __0___    Treatment: ____ None    ____ See Post - Op/PCA Orders    Post - Operative Fluids:   ___x_ Oral   ____ See Post - Op Orders    Plan: Discharge:   __x__Home       _____Floor     _____Critical Care    _____  Other:_________________    Comments:

## 2024-10-28 LAB — SURGICAL PATHOLOGY STUDY: SIGNIFICANT CHANGE UP

## 2024-11-05 ENCOUNTER — APPOINTMENT (OUTPATIENT)
Dept: BREAST CENTER | Facility: CLINIC | Age: 74
End: 2024-11-05
Payer: MEDICARE

## 2024-11-05 VITALS
HEIGHT: 62 IN | BODY MASS INDEX: 26.13 KG/M2 | SYSTOLIC BLOOD PRESSURE: 110 MMHG | DIASTOLIC BLOOD PRESSURE: 70 MMHG | WEIGHT: 142 LBS

## 2024-11-05 DIAGNOSIS — N63.10 UNSPECIFIED LUMP IN THE RIGHT BREAST, UNSPECIFIED QUADRANT: ICD-10-CM

## 2024-11-05 DIAGNOSIS — R92.8 OTHER ABNORMAL AND INCONCLUSIVE FINDINGS ON DIAGNOSTIC IMAGING OF BREAST: ICD-10-CM

## 2024-11-05 DIAGNOSIS — N64.89 OTHER SPECIFIED DISORDERS OF BREAST: ICD-10-CM

## 2024-11-05 PROCEDURE — 99024 POSTOP FOLLOW-UP VISIT: CPT

## 2024-11-20 ENCOUNTER — OUTPATIENT (OUTPATIENT)
Dept: OUTPATIENT SERVICES | Facility: HOSPITAL | Age: 74
LOS: 1 days | End: 2024-11-20
Payer: MEDICARE

## 2024-11-20 ENCOUNTER — APPOINTMENT (OUTPATIENT)
Dept: RADIATION ONCOLOGY | Facility: HOSPITAL | Age: 74
End: 2024-11-20
Payer: MEDICARE

## 2024-11-20 VITALS
WEIGHT: 147.13 LBS | SYSTOLIC BLOOD PRESSURE: 94 MMHG | BODY MASS INDEX: 26.91 KG/M2 | HEART RATE: 103 BPM | RESPIRATION RATE: 18 BRPM | TEMPERATURE: 98.1 F | DIASTOLIC BLOOD PRESSURE: 56 MMHG | OXYGEN SATURATION: 98 %

## 2024-11-20 DIAGNOSIS — Z86.018 PERSONAL HISTORY OF OTHER BENIGN NEOPLASM: Chronic | ICD-10-CM

## 2024-11-20 DIAGNOSIS — Z98.891 HISTORY OF UTERINE SCAR FROM PREVIOUS SURGERY: Chronic | ICD-10-CM

## 2024-11-20 DIAGNOSIS — C82.90 FOLLICULAR LYMPHOMA, UNSPECIFIED, UNSPECIFIED SITE: ICD-10-CM

## 2024-11-20 DIAGNOSIS — C85.80 OTHER SPECIFIED TYPES OF NON-HODGKIN LYMPHOMA, UNSPECIFIED SITE: ICD-10-CM

## 2024-11-20 PROCEDURE — 99212 OFFICE O/P EST SF 10 MIN: CPT

## 2024-11-25 DIAGNOSIS — C85.80 OTHER SPECIFIED TYPES OF NON-HODGKIN LYMPHOMA, UNSPECIFIED SITE: ICD-10-CM

## 2024-12-18 ENCOUNTER — APPOINTMENT (OUTPATIENT)
Age: 74
End: 2024-12-18
Payer: MEDICARE

## 2024-12-18 ENCOUNTER — LABORATORY RESULT (OUTPATIENT)
Age: 74
End: 2024-12-18

## 2024-12-18 ENCOUNTER — OUTPATIENT (OUTPATIENT)
Dept: OUTPATIENT SERVICES | Facility: HOSPITAL | Age: 74
LOS: 1 days | End: 2024-12-18
Payer: MEDICARE

## 2024-12-18 VITALS
DIASTOLIC BLOOD PRESSURE: 67 MMHG | SYSTOLIC BLOOD PRESSURE: 100 MMHG | HEIGHT: 62 IN | BODY MASS INDEX: 26.13 KG/M2 | OXYGEN SATURATION: 97 % | WEIGHT: 142 LBS | HEART RATE: 64 BPM | TEMPERATURE: 97.3 F

## 2024-12-18 DIAGNOSIS — C82.90 FOLLICULAR LYMPHOMA, UNSPECIFIED, UNSPECIFIED SITE: ICD-10-CM

## 2024-12-18 DIAGNOSIS — Z86.018 PERSONAL HISTORY OF OTHER BENIGN NEOPLASM: Chronic | ICD-10-CM

## 2024-12-18 DIAGNOSIS — Z98.891 HISTORY OF UTERINE SCAR FROM PREVIOUS SURGERY: Chronic | ICD-10-CM

## 2024-12-18 LAB
ALBUMIN SERPL ELPH-MCNC: 4 G/DL
ALP BLD-CCNC: 89 U/L
ALT SERPL-CCNC: 16 U/L
ANION GAP SERPL CALC-SCNC: 15 MMOL/L
AST SERPL-CCNC: 18 U/L
BILIRUB DIRECT SERPL-MCNC: <0.2 MG/DL
BILIRUB INDIRECT SERPL-MCNC: >0.1 MG/DL
BILIRUB SERPL-MCNC: 0.3 MG/DL
BUN SERPL-MCNC: 11 MG/DL
CALCIUM SERPL-MCNC: 9.6 MG/DL
CHLORIDE SERPL-SCNC: 105 MMOL/L
CO2 SERPL-SCNC: 26 MMOL/L
CREAT SERPL-MCNC: 1 MG/DL
EGFR: 58 ML/MIN/1.73M2
GLUCOSE SERPL-MCNC: 89 MG/DL
HCT VFR BLD CALC: 36.9 %
HGB BLD-MCNC: 12.1 G/DL
IRON SATN MFR SERPL: 20 %
IRON SERPL-MCNC: 47 UG/DL
MCHC RBC-ENTMCNC: 28.9 PG
MCHC RBC-ENTMCNC: 32.8 G/DL
MCV RBC AUTO: 88.3 FL
PLATELET # BLD AUTO: 345 K/UL
PMV BLD: 10.2 FL
POTASSIUM SERPL-SCNC: 4 MMOL/L
PROT SERPL-MCNC: 6.9 G/DL
RBC # BLD: 4.18 M/UL
RBC # FLD: 15.4 %
SODIUM SERPL-SCNC: 146 MMOL/L
TIBC SERPL-MCNC: 232 UG/DL
UIBC SERPL-MCNC: 185 UG/DL
WBC # FLD AUTO: 8 K/UL

## 2024-12-18 PROCEDURE — 99214 OFFICE O/P EST MOD 30 MIN: CPT

## 2024-12-18 PROCEDURE — 80048 BASIC METABOLIC PNL TOTAL CA: CPT

## 2024-12-18 PROCEDURE — 85027 COMPLETE CBC AUTOMATED: CPT

## 2024-12-18 PROCEDURE — 83921 ORGANIC ACID SINGLE QUANT: CPT

## 2024-12-18 PROCEDURE — 86225 DNA ANTIBODY NATIVE: CPT

## 2024-12-18 PROCEDURE — 80076 HEPATIC FUNCTION PANEL: CPT

## 2024-12-18 PROCEDURE — 82728 ASSAY OF FERRITIN: CPT

## 2024-12-18 PROCEDURE — G2211 COMPLEX E/M VISIT ADD ON: CPT

## 2024-12-18 PROCEDURE — 86235 NUCLEAR ANTIGEN ANTIBODY: CPT

## 2024-12-18 PROCEDURE — 83540 ASSAY OF IRON: CPT

## 2024-12-18 PROCEDURE — 83550 IRON BINDING TEST: CPT

## 2024-12-18 PROCEDURE — 86431 RHEUMATOID FACTOR QUANT: CPT

## 2024-12-18 PROCEDURE — 86038 ANTINUCLEAR ANTIBODIES: CPT

## 2024-12-19 DIAGNOSIS — C82.90 FOLLICULAR LYMPHOMA, UNSPECIFIED, UNSPECIFIED SITE: ICD-10-CM

## 2024-12-19 LAB
FERRITIN SERPL-MCNC: 706 NG/ML
RHEUMATOID FACT SER QL: 11 IU/ML

## 2024-12-20 LAB
ANA PAT FLD IF-IMP: ABNORMAL
ANA SER IF-ACNC: ABNORMAL
DSDNA AB SER-ACNC: 2 IU/ML
ENA RNP AB SER IA-ACNC: 3.1 AL
ENA SM AB SER IA-ACNC: <0.2 AL

## 2024-12-23 LAB — METHYLMALONATE SERPL-SCNC: 175 NMOL/L

## 2025-01-17 ENCOUNTER — APPOINTMENT (OUTPATIENT)
Dept: CARDIOLOGY | Facility: CLINIC | Age: 75
End: 2025-01-17
Payer: MEDICARE

## 2025-01-17 VITALS
HEART RATE: 105 BPM | HEIGHT: 62 IN | DIASTOLIC BLOOD PRESSURE: 70 MMHG | WEIGHT: 142 LBS | BODY MASS INDEX: 26.13 KG/M2 | SYSTOLIC BLOOD PRESSURE: 108 MMHG

## 2025-01-17 DIAGNOSIS — R06.09 OTHER FORMS OF DYSPNEA: ICD-10-CM

## 2025-01-17 DIAGNOSIS — R06.02 SHORTNESS OF BREATH: ICD-10-CM

## 2025-01-17 PROCEDURE — 99214 OFFICE O/P EST MOD 30 MIN: CPT

## 2025-01-17 PROCEDURE — 93000 ELECTROCARDIOGRAM COMPLETE: CPT

## 2025-01-18 LAB
ANION GAP SERPL CALC-SCNC: 14 MMOL/L
BUN SERPL-MCNC: 21 MG/DL
CALCIUM SERPL-MCNC: 11.1 MG/DL
CHLORIDE SERPL-SCNC: 104 MMOL/L
CO2 SERPL-SCNC: 26 MMOL/L
CREAT SERPL-MCNC: 1.1 MG/DL
EGFR: 52 ML/MIN/1.73M2
GLUCOSE SERPL-MCNC: 94 MG/DL
HCT VFR BLD CALC: 44.3 %
HGB BLD-MCNC: 13.7 G/DL
INR PPP: 1 RATIO
MCHC RBC-ENTMCNC: 27.7 PG
MCHC RBC-ENTMCNC: 30.9 G/DL
MCV RBC AUTO: 89.5 FL
PLATELET # BLD AUTO: 325 K/UL
PMV BLD AUTO: 0 /100 WBCS
PMV BLD: 11 FL
POTASSIUM SERPL-SCNC: 5.4 MMOL/L
PT BLD: 11.8 SEC
RBC # BLD: 4.95 M/UL
RBC # FLD: 16 %
SODIUM SERPL-SCNC: 144 MMOL/L
WBC # FLD AUTO: 9.67 K/UL

## 2025-02-02 VITALS
DIASTOLIC BLOOD PRESSURE: 70 MMHG | OXYGEN SATURATION: 98 % | HEART RATE: 83 BPM | HEIGHT: 62 IN | WEIGHT: 141.98 LBS | RESPIRATION RATE: 16 BRPM | SYSTOLIC BLOOD PRESSURE: 127 MMHG

## 2025-02-02 NOTE — H&P CARDIOLOGY - NSICDXPASTSURGICALHX_GEN_ALL_CORE_FT
PAST SURGICAL HISTORY:  H/O  section     History of appendectomy     History of uterine fibroid      PAST SURGICAL HISTORY:  Breast cyst     H/O  section     History of appendectomy     History of uterine fibroid

## 2025-02-02 NOTE — H&P CARDIOLOGY - NSICDXPASTMEDICALHX_GEN_ALL_CORE_FT
PAST MEDICAL HISTORY:  Follicular lymphoma     GERD (gastroesophageal reflux disease)     Migraines     Osteoporosis     S/P radiation therapy      PAST MEDICAL HISTORY:  Follicular lymphoma     GERD (gastroesophageal reflux disease)     HTN (hypertension)     Migraines     Osteoporosis     S/P radiation therapy

## 2025-02-02 NOTE — H&P CARDIOLOGY - HISTORY OF PRESENT ILLNESS
Patient is a 74y Female PMH of HTN, osteoporosis, follicular lymphoma s/p RXT to right cheek 2024. Pt reports REID and fatigue after radiation. She has seen pulmonary and prescribed nebulizers with no improvement. Given persistent dyspnea on minimal exertion and normal EF, patient presents today for [facility] for coronary evaluation.     Pre cath note:  indication:  [ ] STEMI                [ ] NSTEMI                 [ ] Acute coronary syndrome                   [ ]Unstable Angina   [ ] high risk  [ ] intermediate risk  [ ] low risk                   [ ] Stable Angina     non-invasive testing:                          Date:                     result: [ ] high risk  [ ] intermediate risk  [ ] low risk    Anti- Anginal medications:                    [ ] not used d/t                     [ ] used   ( ) BB     ( ) CCB      ( ) Nitrate   (  ) Ranexa          [ ] not used but strong indication not to use    Ejection Fraction                   [ ] <29            [ ] 30-39%   [ ] 40-49%     [ x]>50%    CHF          [ ] active (within last 14 days on meds   [ ] Chronic (on meds but no exacerbation)  NYHA Functional Class:  (  ) Class I (no limitations)  (  ) Class II (slight limitation)  (  x) Class III (marked limitation)  (  ) Class IV (symptoms at rest)    COPD                   [ ] mild (on chronic bronchodilators)  [ ] moderate (on chronic steroid therapy)      [ ] severe (indication for home O2 or PACO2 >50)    Other risk factors:                     [ ] Previous MI                     [ ] CVA/ stroke                    [ ] carotid stent/ CEA                    [ ] PVD/PAD- (arterial aneurysm, non-palpable pulses, tortuous vessel with inability to insert catheter, infra-renal dissection, renal or subclavian artery stenosis)                    [ ] previous CABG                    [ ] Renal Failure:  on HD  (  ) yes  (  ) no                    [ ] Diabetic  (  ) Type 1  (  ) Type 2                                         (  ) Insulin dependent  (  ) non-insulin dependent                                         (  ) Metformin  (  ) Januvia  (  ) Glimepiride  (  ) Glipizide  (  ) Glyburide  (  ) Actos                                         (  ) GLP-1 receptor agonists (Ozempic, Mounjaro, Wegovy, trulicity, Byetta, Victoza)                                         (  ) SGLT2 Inhibitors (Farxiga, Jardiance, Invokana)                                         (  ) Other                Bleeding Risk: 2.4%    Pre-cath Hydration: (  )  cc IV bolus x 1 over 1 hr followed by:  (  ) NS @ 75cc/hr until procedure (up to 2 hrs) if EF> 50%                                                                                                                         (  ) NS @ 50cc/hr until procedure (up to 2 hrs) if EF< 50%                                      (  ) No precath hydration d/t    RIGHT RADIAL ARTERY EVALUATION:  ISRA TEST: [] Negative          [] Positive    EF:   Date:    EKG:   Date:   74y Female with PMH of HTN, osteoporosis, GERD, follicular lymphoma s/p RXT to right cheek 2024, who presented to her cardiologist with c/o REID and fatigue on minimal exertion after radiation.  She has seen pulmonary and prescribed nebulizers with no improvement.  Denies any chest pain, dizziness, n/v, diaphoresis, orthopnea, PND, LE edema, palpitations, syncope.   Given persistent dyspnea on minimal exertion and normal EF, patient presents today for [facility] with possible intervention if clinically indicated.      Pre cath note:  indication:  [ ] STEMI                [ ] NSTEMI                 [ ] Acute coronary syndrome                   [ ]Unstable Angina   [ ] high risk  [ ] intermediate risk  [ ] low risk                   [ ] Stable Angina     non-invasive testing:                   Date:                     result: [ ] high risk  [ ] intermediate risk  [ ] low risk    Anti- Anginal medications:                    [ ] not used d/t                     [ ] used   ( ) BB     ( ) CCB      ( ) Nitrate   (  ) Ranexa          [ ] not used but strong indication not to use    Ejection Fraction                   [ ] <29            [ ] 30-39%   [ ] 40-49%     [ x]>50%    CHF          [ ] active (within last 14 days on meds   [ ] Chronic (on meds but no exacerbation)  NYHA Functional Class:  (  ) Class I (no limitations)  (  ) Class II (slight limitation)  (  x) Class III (marked limitation)  (  ) Class IV (symptoms at rest)    COPD                   [x ] mild (on chronic bronchodilators)  [ ] moderate (on chronic steroid therapy)      [ ] severe (indication for home O2 or PACO2 >50)    Other risk factors:                     [ ] Previous MI                     [ ] CVA/ stroke                    [ ] carotid stent/ CEA                    [ ] PVD/PAD- (arterial aneurysm, non-palpable pulses, tortuous vessel with inability to insert catheter, infra-renal dissection, renal or subclavian artery stenosis)                    [ ] previous CABG                    [ ] Renal Failure:  on HD  (  ) yes  (  ) no                    [ ] Diabetic  (  ) Type 1  (  ) Type 2                                         (  ) Insulin dependent  (  ) non-insulin dependent                                         (  ) Metformin  (  ) Januvia  (  ) Glimepiride  (  ) Glipizide  (  ) Glyburide  (  ) Actos                                         (  ) GLP-1 receptor agonists (Ozempic, Mounjaro, Wegovy, trulicity, Byetta, Victoza)                                         (  ) SGLT2 Inhibitors (Farxiga, Jardiance, Invokana)                                         (  ) Other      Bleeding Risk: 2.4%    Pre-cath Hydration: (  )  cc IV bolus x 1 over 1 hr followed by:  (  ) NS @ 75cc/hr until procedure (up to 2 hrs) if EF> 50%                                                                                                                         (  ) NS @ 50cc/hr until procedure (up to 2 hrs) if EF< 50%                                      (  ) No precath hydration d/t    RIGHT RADIAL ARTERY EVALUATION:  ISRA TEST: [] Negative          [] Positive       74y Female with PMH of HTN, osteoporosis, GERD, 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  She has seen pulmonary and prescribed nebulizers with no improvement.  Denies any chest pain, dizziness, n/v, diaphoresis, orthopnea, PND, LE edema, palpitations, syncope. TTE 12/20203: EF 61%, mild MR.  Given persistent dyspnea on minimal exertion and normal EF, patient presents today for [facility] with possible intervention if clinically indicated.      Pre cath note:  indication:  [ ] STEMI                [ ] NSTEMI                 [ ] Acute coronary syndrome                   [ ]Unstable Angina   [ ] high risk  [ ] intermediate risk  [ ] low risk                   [x ] Stable Angina     non-invasive testing:                   Date:                     result: [ ] high risk  [ x] intermediate risk  [ ] low risk    Anti- Anginal medications:                    [x ] not used                      [ ] used   ( ) BB     ( ) CCB      ( ) Nitrate   (  ) Ranexa          [ ] not used but strong indication not to use    Ejection Fraction                   [ ] <29            [ ] 30-39%   [ ] 40-49%     [ x]>50%    CHF          [ ] active (within last 14 days on meds   [ ] Chronic (on meds but no exacerbation)  NYHA Functional Class:  (  ) Class I (no limitations)  (  ) Class II (slight limitation)  (  x) Class III (marked limitation)  (  ) Class IV (symptoms at rest)    COPD                   [x ] mild (on chronic bronchodilators)  [ ] moderate (on chronic steroid therapy)      [ ] severe (indication for home O2 or PACO2 >50)    Other risk factors:                     [ ] Previous MI                     [ ] CVA/ stroke                    [ ] carotid stent/ CEA                    [ ] PVD/PAD- (arterial aneurysm, non-palpable pulses, tortuous vessel with inability to insert catheter, infra-renal dissection, renal or subclavian artery stenosis)                    [ ] previous CABG                    [ ] Renal Failure:  on HD  (  ) yes  (  ) no                    [ ] Diabetic  (  ) Type 1  (  ) Type 2                                         (  ) Insulin dependent  (  ) non-insulin dependent                                         (  ) Metformin  (  ) Januvia  (  ) Glimepiride  (  ) Glipizide  (  ) Glyburide  (  ) Actos                                         (  ) GLP-1 receptor agonists (Ozempic, Mounjaro, Wegovy, trulicity, Byetta, Victoza)                                         (  ) SGLT2 Inhibitors (Farxiga, Jardiance, Invokana)                                         (  ) Other      Bleeding Risk: 2.4%    Pre-cath Hydration: (  )  cc IV bolus x 1 over 1 hr followed by:  (  ) NS @ 75cc/hr until procedure (up to 2 hrs) if EF> 50%                                                                                                                         (  ) NS @ 50cc/hr until procedure (up to 2 hrs) if EF< 50%                                      (  ) No precath hydration d/t    RIGHT RADIAL ARTERY EVALUATION:  ISRA TEST: [] Negative          [] Positive       74y Female with PMH of HTN (labile BP, could not tolerate meds in the past as per pt), osteoporosis, GERD, 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  She has seen pulmonary and prescribed nebulizers with no improvement.  Denies any chest pain, dizziness, n/v, diaphoresis, orthopnea, PND, LE edema, palpitations, syncope. TTE 12/20203: EF 61%, mild MR.  Given persistent dyspnea on minimal exertion and normal EF, patient presents today for [facility] with possible intervention if clinically indicated.      Pre cath note:  indication:  [ ] STEMI                [ ] NSTEMI                 [ ] Acute coronary syndrome                   [ ]Unstable Angina   [ ] high risk  [ ] intermediate risk  [ ] low risk                   [x ] Stable Angina     non-invasive testing:                   Date:                     result: [ ] high risk  [ x] intermediate risk  [ ] low risk    Anti- Anginal medications:                    [x ] not used due to soft BP                   [ ] used   ( ) BB     ( ) CCB      ( ) Nitrate   (  ) Ranexa          [ ] not used but strong indication not to use    Ejection Fraction                   [ ] <29            [ ] 30-39%   [ ] 40-49%     [ x]>50%    CHF          [ ] active (within last 14 days on meds   [ ] Chronic (on meds but no exacerbation)  NYHA Functional Class:  (  ) Class I (no limitations)  (  ) Class II (slight limitation)  (  x) Class III (marked limitation)  (  ) Class IV (symptoms at rest)    COPD                   [x ] mild (on chronic bronchodilators)  [ ] moderate (on chronic steroid therapy)      [ ] severe (indication for home O2 or PACO2 >50)    Other risk factors:                     [ ] Previous MI                     [ ] CVA/ stroke                    [ ] carotid stent/ CEA                    [ ] PVD/PAD- (arterial aneurysm, non-palpable pulses, tortuous vessel with inability to insert catheter, infra-renal dissection, renal or subclavian artery stenosis)                    [ ] previous CABG                    [ ] Renal Failure:  on HD  (  ) yes  (  ) no                    [ ] Diabetic  (  ) Type 1  (  ) Type 2                                         (  ) Insulin dependent  (  ) non-insulin dependent                                         (  ) Metformin  (  ) Januvia  (  ) Glimepiride  (  ) Glipizide  (  ) Glyburide  (  ) Actos                                         (  ) GLP-1 receptor agonists (Ozempic, Mounjaro, Wegovy, trulicity, Byetta, Victoza)                                         (  ) SGLT2 Inhibitors (Farxiga, Jardiance, Invokana)                                         (  ) Other      Bleeding Risk: 2.4%    Pre-cath Hydration: ( x )  cc IV bolus x 1 over 1 hr followed by:  (x  ) NS @ 100cc/hr until procedure (up to 2 hrs) if EF> 50%                                                                                                                           RIGHT RADIAL ARTERY EVALUATION:  ISRA TEST: suboptimal

## 2025-02-02 NOTE — H&P CARDIOLOGY - COMMENTS
74y Female with PMH of HTN, osteoporosis, GERD, follicular lymphoma s/p RXT to right cheek 2024, who presented to her cardiologist with c/o REID and fatigue on minimal exertion after radiation.  She has seen pulmonary and prescribed nebulizers with no improvement. Given persistent dyspnea on minimal exertion and normal EF, patient presents today for [facility] with possible intervention if clinically indicated. 74y Female with PMH of HTN, osteoporosis, GERD, follicular lymphoma s/p RXT to right cheek 12/2024, who presented to her cardiologist with c/o REID and fatigue on minimal exertion.  She has seen pulmonary and prescribed nebulizers with no improvement. Given persistent dyspnea on minimal exertion and normal EF, patient presents today for [facility] with possible intervention if clinically indicated. 74y Female with PMH of HTN, osteoporosis, GERD, follicular lymphoma s/p RXT to right cheek 03/2024, who presented to her cardiologist with c/o REID and fatigue on minimal exertion.  She has seen pulmonary and prescribed nebulizers with no improvement. Given persistent dyspnea on minimal exertion and normal EF, patient presents today for [facility] with possible intervention if clinically indicated.

## 2025-02-05 ENCOUNTER — TRANSCRIPTION ENCOUNTER (OUTPATIENT)
Age: 75
End: 2025-02-05

## 2025-02-05 ENCOUNTER — OUTPATIENT (OUTPATIENT)
Dept: OUTPATIENT SERVICES | Facility: HOSPITAL | Age: 75
LOS: 1 days | Discharge: HOME | End: 2025-02-05
Payer: MEDICARE

## 2025-02-05 VITALS
OXYGEN SATURATION: 99 % | RESPIRATION RATE: 20 BRPM | DIASTOLIC BLOOD PRESSURE: 64 MMHG | HEART RATE: 88 BPM | SYSTOLIC BLOOD PRESSURE: 121 MMHG

## 2025-02-05 DIAGNOSIS — Z98.891 HISTORY OF UTERINE SCAR FROM PREVIOUS SURGERY: Chronic | ICD-10-CM

## 2025-02-05 DIAGNOSIS — Z86.018 PERSONAL HISTORY OF OTHER BENIGN NEOPLASM: Chronic | ICD-10-CM

## 2025-02-05 DIAGNOSIS — N60.09 SOLITARY CYST OF UNSPECIFIED BREAST: Chronic | ICD-10-CM

## 2025-02-05 DIAGNOSIS — I25.119 ATHEROSCLEROTIC HEART DISEASE OF NATIVE CORONARY ARTERY WITH UNSPECIFIED ANGINA PECTORIS: ICD-10-CM

## 2025-02-05 LAB
ANION GAP SERPL CALC-SCNC: 10 MMOL/L — SIGNIFICANT CHANGE UP (ref 7–14)
BUN SERPL-MCNC: 18 MG/DL — SIGNIFICANT CHANGE UP (ref 10–20)
CALCIUM SERPL-MCNC: 9.4 MG/DL — SIGNIFICANT CHANGE UP (ref 8.4–10.5)
CHLORIDE SERPL-SCNC: 98 MMOL/L — SIGNIFICANT CHANGE UP (ref 98–110)
CO2 SERPL-SCNC: 26 MMOL/L — SIGNIFICANT CHANGE UP (ref 17–32)
CREAT SERPL-MCNC: 0.9 MG/DL — SIGNIFICANT CHANGE UP (ref 0.7–1.5)
EGFR: 66 ML/MIN/1.73M2 — SIGNIFICANT CHANGE UP
GLUCOSE SERPL-MCNC: 91 MG/DL — SIGNIFICANT CHANGE UP (ref 70–99)
HCT VFR BLD CALC: 44.2 % — SIGNIFICANT CHANGE UP (ref 37–47)
HGB BLD-MCNC: 14 G/DL — SIGNIFICANT CHANGE UP (ref 12–16)
MCHC RBC-ENTMCNC: 28.4 PG — SIGNIFICANT CHANGE UP (ref 27–31)
MCHC RBC-ENTMCNC: 31.7 G/DL — LOW (ref 32–37)
MCV RBC AUTO: 89.7 FL — SIGNIFICANT CHANGE UP (ref 81–99)
NRBC # BLD: 0 /100 WBCS — SIGNIFICANT CHANGE UP (ref 0–0)
NRBC BLD-RTO: 0 /100 WBCS — SIGNIFICANT CHANGE UP (ref 0–0)
PLATELET # BLD AUTO: 309 K/UL — SIGNIFICANT CHANGE UP (ref 130–400)
PMV BLD: 11.2 FL — HIGH (ref 7.4–10.4)
POTASSIUM SERPL-MCNC: 4.5 MMOL/L — SIGNIFICANT CHANGE UP (ref 3.5–5)
POTASSIUM SERPL-SCNC: 4.5 MMOL/L — SIGNIFICANT CHANGE UP (ref 3.5–5)
RBC # BLD: 4.93 M/UL — SIGNIFICANT CHANGE UP (ref 4.2–5.4)
RBC # FLD: 15.9 % — HIGH (ref 11.5–14.5)
SODIUM SERPL-SCNC: 134 MMOL/L — LOW (ref 135–146)
WBC # BLD: 6.61 K/UL — SIGNIFICANT CHANGE UP (ref 4.8–10.8)
WBC # FLD AUTO: 6.61 K/UL — SIGNIFICANT CHANGE UP (ref 4.8–10.8)

## 2025-02-05 PROCEDURE — 36415 COLL VENOUS BLD VENIPUNCTURE: CPT

## 2025-02-05 PROCEDURE — 80048 BASIC METABOLIC PNL TOTAL CA: CPT

## 2025-02-05 PROCEDURE — C1889: CPT

## 2025-02-05 PROCEDURE — 93460 R&L HRT ART/VENTRICLE ANGIO: CPT | Mod: 26

## 2025-02-05 PROCEDURE — 85027 COMPLETE CBC AUTOMATED: CPT

## 2025-02-05 PROCEDURE — 93460 R&L HRT ART/VENTRICLE ANGIO: CPT

## 2025-02-05 PROCEDURE — C1894: CPT

## 2025-02-05 PROCEDURE — C1769: CPT

## 2025-02-05 RX ORDER — FREMANEZUMAB-VFRM 225 MG/1.5ML
225 INJECTION SUBCUTANEOUS
Refills: 0 | DISCHARGE

## 2025-02-05 RX ORDER — IBANDRONATE SODIUM 150 MG/1
1 TABLET ORAL
Refills: 0 | DISCHARGE

## 2025-02-05 RX ORDER — ANTISEPTIC SURGICAL SCRUB 0.04 MG/ML
1 SOLUTION TOPICAL ONCE
Refills: 0 | Status: DISCONTINUED | OUTPATIENT
Start: 2025-02-05 | End: 2025-02-05

## 2025-02-05 RX ORDER — BACTERIOSTATIC SODIUM CHLORIDE 0.9 %
1000 VIAL (ML) INJECTION
Refills: 0 | Status: DISCONTINUED | OUTPATIENT
Start: 2025-02-05 | End: 2025-02-05

## 2025-02-05 RX ORDER — MORPHINE SULFATE 60 MG/1
2 TABLET, FILM COATED, EXTENDED RELEASE ORAL ONCE
Refills: 0 | Status: DISCONTINUED | OUTPATIENT
Start: 2025-02-05 | End: 2025-02-05

## 2025-02-05 RX ORDER — ASPIRIN 81 MG/1
324 TABLET, COATED ORAL ONCE
Refills: 0 | Status: COMPLETED | OUTPATIENT
Start: 2025-02-05 | End: 2025-02-05

## 2025-02-05 RX ORDER — ALBUTEROL 90 MCG
2 AEROSOL REFILL (GRAM) INHALATION
Refills: 0 | DISCHARGE

## 2025-02-05 RX ORDER — BACTERIOSTATIC SODIUM CHLORIDE 0.9 %
250 VIAL (ML) INJECTION ONCE
Refills: 0 | Status: COMPLETED | OUTPATIENT
Start: 2025-02-05 | End: 2025-02-05

## 2025-02-05 RX ADMIN — ASPIRIN 324 MILLIGRAM(S): 81 TABLET, COATED ORAL at 08:15

## 2025-02-05 RX ADMIN — Medication 500 MILLILITER(S): at 08:15

## 2025-02-05 RX ADMIN — Medication 100 MILLILITER(S): at 12:02

## 2025-02-05 RX ADMIN — Medication 100 MILLILITER(S): at 08:15

## 2025-02-05 RX ADMIN — MORPHINE SULFATE 2 MILLIGRAM(S): 60 TABLET, FILM COATED, EXTENDED RELEASE ORAL at 12:02

## 2025-02-05 NOTE — ASU DISCHARGE PLAN (ADULT/PEDIATRIC) - CARE PROVIDER_API CALL
Thom Seth  Cardiovascular Disease  [address] [place] [id number]  Phone: [phone]  Fax: [phone]  Follow Up Time: 2 weeks

## 2025-02-05 NOTE — CHART NOTE - NSCHARTNOTEFT_GEN_A_CORE
PROCEDURE:   - Left heart cath  - Right heart cath     PHYSICIAN: Dr. Seth  FELLOW: Dr. Dereck Mcmillan    Pre-procedure Diagnosis: Dyspnea on Exertion    Consent: Patient  Anesthesia: Sedation | Local     ACCESS & CLOSURE:  6 Fr R Femoral Artery -> Manual compression   7 Fr R Femoral Vein -> Manual compression      IV Contrast: 40 mL      Intervention: None    Implants: None    AUC: 7     FINDINGS:     Coronary Dominance: Right    LM: No significant disease    LAD: Minor luminal irregularities   D1: Minor luminal irregularities     CX: No significant disease  OM1: No significant disease    Ramus: No significant disease    RCA: No significant disease  RPDA: No significant disease       LVEDP: 15 mmHg       PA % sat: 77%  FA % sat: 98.9%    RA pressure: 9/8/7 mmHg  RV pressure: 27/11  mmHg  PA pressure: 27/11/17 mmHg  PCWP: 10/7/7 mmHg    CO/CI [place]: 4.08/2.47  CO/CI Thermodilution:  3.35/2.03  PVR (woods units): 2.45  SVR (dynes.s/cm5):  1628    AV gradient: No significant gradient     ESTIMATED BLOOD LOSS: < 10 mL      CONDITION: Good   SPECIMEN REMOVED: N/A     POST-OP DIAGNOSIS:    - Minor luminal irregularities     PLAN OF CARE:   [X] D/C Home Today   [X] Follow up with pulmonary for PFTs   [X] Medications:   - Continue all inpatient medications   [X] LVEDP guided IV Fluids: NS @ 100cc/h x 4 hours  [X] Remove femoral sheath. Hold manual pressure if signs of hematoma or bleeding over  femoral access site. PROCEDURE:   - Left heart cath  - Right heart cath     PHYSICIAN: Dr. Seth  FELLOW: Dr. Dereck Mcmillan    Pre-procedure Diagnosis: Dyspnea on Exertion    Consent: Patient  Anesthesia: Sedation | Local     ACCESS & CLOSURE:  6 Fr R Femoral Artery -> Manual compression   7 Fr R Femoral Vein -> Manual compression      IV Contrast: 40 mL      Intervention: None    Implants: None    AUC: 7     FINDINGS:     Coronary Dominance: Right    LM: No significant disease    LAD: Minor luminal irregularities   D1: Minor luminal irregularities     CX: No significant disease  OM1: No significant disease    Ramus: No significant disease    RCA: No significant disease  RPDA: No significant disease       LVEDP: 15 mmHg       PA % sat: 77%  FA % sat: 98.9%    RA pressure: 9/8/7 mmHg  RV pressure: 27/11  mmHg  PA pressure: 27/11/17 mmHg  PCWP: 10/7/7 mmHg    CO/CI [place]: 4.08/2.47  CO/CI Thermodilution:  3.35/2.03  PVR (woods units): 2.45  SVR (dynes.s/cm5):  1628    AV gradient: No significant gradient     ESTIMATED BLOOD LOSS: < 10 mL      CONDITION: Good   SPECIMEN REMOVED: N/A     POST-OP DIAGNOSIS:    - Normal coronary arteries.  - Normal PA pressure, normal cardiac output, normal PCWP.  - No evidence of cardiac etiology of dyspnea.    PLAN OF CARE:   [X] D/C Home Today   [X] Follow up with pulmonary for PFTs   [X] Medications:   - Continue all inpatient medications   [X] LVEDP guided IV Fluids: NS @ 100cc/h x 4 hours  [X] Remove femoral sheath. Hold manual pressure if signs of hematoma or bleeding over  femoral access site.

## 2025-02-05 NOTE — ASU PATIENT PROFILE, ADULT - FALL HARM RISK - UNIVERSAL INTERVENTIONS
Bed in lowest position, wheels locked, appropriate side rails in place/Call bell, personal items and telephone in reach/Instruct patient to call for assistance before getting out of bed or chair/Non-slip footwear when patient is out of bed/[place] to call system/Physically safe environment - no spills, clutter or unnecessary equipment/Purposeful Proactive Rounding/Room/bathroom lighting operational, light cord in reach

## 2025-02-05 NOTE — ASU DISCHARGE PLAN (ADULT/PEDIATRIC) - FINANCIAL ASSISTANCE
[facility] provides services at a reduced cost to those who are determined to be eligible through [facility]’s financial assistance program. Information regarding [facility]’s financial assistance program can be found by going to [email or web address] or by calling [phone].

## 2025-02-05 NOTE — ASU DISCHARGE PLAN (ADULT/PEDIATRIC) - ASU DC SPECIAL INSTRUCTIONSFT
Discharge Instructions as follows:  - Continue medical regimen as prescribed.  - Instructed to call 911 if chest pain, shortness of breath or bleeding from access site.  - No heavy lifting > 10lbs x 1 week.  - No driving x 24 hours.  - No baths, swimming pools x 1 week, may shower  - Low sodium low fat low cholesterol diet  - Follow-up with Cardiologist in 1-2 weeks after discharge  - Soreness or tenderness at the site is possible, it will diminish over time. You may take Tylenol every 4-6 hours as needed. Nothing stronger is needed. NO Motrin/Ibuprofen  - Any questions call cardiac cath lab [phone]

## 2025-02-07 PROBLEM — I10 ESSENTIAL (PRIMARY) HYPERTENSION: Chronic | Status: ACTIVE | Noted: 2025-02-05

## 2025-02-11 DIAGNOSIS — I10 ESSENTIAL (PRIMARY) HYPERTENSION: ICD-10-CM

## 2025-02-11 DIAGNOSIS — I25.10 ATHEROSCLEROTIC HEART DISEASE OF NATIVE CORONARY ARTERY WITHOUT ANGINA PECTORIS: ICD-10-CM

## 2025-03-06 ENCOUNTER — APPOINTMENT (OUTPATIENT)
Dept: CARDIOLOGY | Facility: CLINIC | Age: 75
End: 2025-03-06
Payer: MEDICARE

## 2025-03-06 VITALS
BODY MASS INDEX: 25.58 KG/M2 | HEIGHT: 62 IN | DIASTOLIC BLOOD PRESSURE: 58 MMHG | WEIGHT: 139 LBS | HEART RATE: 100 BPM | SYSTOLIC BLOOD PRESSURE: 80 MMHG

## 2025-03-06 VITALS — SYSTOLIC BLOOD PRESSURE: 86 MMHG | DIASTOLIC BLOOD PRESSURE: 60 MMHG

## 2025-03-06 DIAGNOSIS — I95.9 HYPOTENSION, UNSPECIFIED: ICD-10-CM

## 2025-03-06 DIAGNOSIS — R06.09 OTHER FORMS OF DYSPNEA: ICD-10-CM

## 2025-03-06 DIAGNOSIS — I10 ESSENTIAL (PRIMARY) HYPERTENSION: ICD-10-CM

## 2025-03-06 PROCEDURE — 99214 OFFICE O/P EST MOD 30 MIN: CPT

## 2025-03-06 PROCEDURE — 93000 ELECTROCARDIOGRAM COMPLETE: CPT

## 2025-04-10 ENCOUNTER — OUTPATIENT (OUTPATIENT)
Dept: OUTPATIENT SERVICES | Facility: HOSPITAL | Age: 75
LOS: 1 days | End: 2025-04-10
Payer: MEDICARE

## 2025-04-10 ENCOUNTER — RESULT REVIEW (OUTPATIENT)
Age: 75
End: 2025-04-10

## 2025-04-10 DIAGNOSIS — Z86.018 PERSONAL HISTORY OF OTHER BENIGN NEOPLASM: Chronic | ICD-10-CM

## 2025-04-10 DIAGNOSIS — N60.09 SOLITARY CYST OF UNSPECIFIED BREAST: Chronic | ICD-10-CM

## 2025-04-10 DIAGNOSIS — Z98.891 HISTORY OF UTERINE SCAR FROM PREVIOUS SURGERY: Chronic | ICD-10-CM

## 2025-04-10 DIAGNOSIS — N64.89 OTHER SPECIFIED DISORDERS OF BREAST: ICD-10-CM

## 2025-04-10 PROCEDURE — 76641 ULTRASOUND BREAST COMPLETE: CPT | Mod: 50

## 2025-04-10 PROCEDURE — 77066 DX MAMMO INCL CAD BI: CPT

## 2025-04-10 PROCEDURE — 76641 ULTRASOUND BREAST COMPLETE: CPT | Mod: 26,50

## 2025-04-10 PROCEDURE — G0279: CPT | Mod: 26

## 2025-04-10 PROCEDURE — 77066 DX MAMMO INCL CAD BI: CPT | Mod: 26

## 2025-04-10 PROCEDURE — G0279: CPT

## 2025-04-11 DIAGNOSIS — N64.89 OTHER SPECIFIED DISORDERS OF BREAST: ICD-10-CM

## 2025-04-15 ENCOUNTER — APPOINTMENT (OUTPATIENT)
Dept: BREAST CENTER | Facility: CLINIC | Age: 75
End: 2025-04-15
Payer: MEDICARE

## 2025-04-15 VITALS
BODY MASS INDEX: 25.76 KG/M2 | HEIGHT: 62 IN | SYSTOLIC BLOOD PRESSURE: 107 MMHG | WEIGHT: 140 LBS | DIASTOLIC BLOOD PRESSURE: 70 MMHG

## 2025-04-15 DIAGNOSIS — N60.12 DIFFUSE CYSTIC MASTOPATHY OF RIGHT BREAST: ICD-10-CM

## 2025-04-15 DIAGNOSIS — N60.11 DIFFUSE CYSTIC MASTOPATHY OF RIGHT BREAST: ICD-10-CM

## 2025-04-15 DIAGNOSIS — D24.1 BENIGN NEOPLASM OF RIGHT BREAST: ICD-10-CM

## 2025-04-15 DIAGNOSIS — N64.89 OTHER SPECIFIED DISORDERS OF BREAST: ICD-10-CM

## 2025-04-15 PROCEDURE — 99214 OFFICE O/P EST MOD 30 MIN: CPT

## 2025-06-09 ENCOUNTER — APPOINTMENT (OUTPATIENT)
Age: 75
End: 2025-06-09

## 2025-06-09 ENCOUNTER — OUTPATIENT (OUTPATIENT)
Dept: OUTPATIENT SERVICES | Facility: HOSPITAL | Age: 75
LOS: 1 days | End: 2025-06-09
Payer: MEDICARE

## 2025-06-09 DIAGNOSIS — Z98.891 HISTORY OF UTERINE SCAR FROM PREVIOUS SURGERY: Chronic | ICD-10-CM

## 2025-06-09 DIAGNOSIS — Z86.018 PERSONAL HISTORY OF OTHER BENIGN NEOPLASM: Chronic | ICD-10-CM

## 2025-06-09 DIAGNOSIS — N60.09 SOLITARY CYST OF UNSPECIFIED BREAST: Chronic | ICD-10-CM

## 2025-06-09 DIAGNOSIS — C82.90 FOLLICULAR LYMPHOMA, UNSPECIFIED, UNSPECIFIED SITE: ICD-10-CM

## 2025-06-09 PROCEDURE — 83540 ASSAY OF IRON: CPT

## 2025-06-09 PROCEDURE — 85025 COMPLETE CBC W/AUTO DIFF WBC: CPT

## 2025-06-09 PROCEDURE — 83550 IRON BINDING TEST: CPT

## 2025-06-09 PROCEDURE — 80048 BASIC METABOLIC PNL TOTAL CA: CPT

## 2025-06-09 PROCEDURE — 82728 ASSAY OF FERRITIN: CPT

## 2025-06-09 PROCEDURE — 80076 HEPATIC FUNCTION PANEL: CPT

## 2025-06-09 PROCEDURE — 36415 COLL VENOUS BLD VENIPUNCTURE: CPT

## 2025-06-09 PROCEDURE — 86431 RHEUMATOID FACTOR QUANT: CPT

## 2025-06-10 DIAGNOSIS — C82.90 FOLLICULAR LYMPHOMA, UNSPECIFIED, UNSPECIFIED SITE: ICD-10-CM

## 2025-06-16 LAB
ALBUMIN SERPL ELPH-MCNC: 4.5 G/DL
ALP BLD-CCNC: 107 U/L
ALT SERPL-CCNC: 23 U/L
ANION GAP SERPL CALC-SCNC: 16 MMOL/L
AST SERPL-CCNC: 18 U/L
AUTO BASOPHILS #: 0.03 K/UL
AUTO BASOPHILS %: 0.4 %
AUTO EOSINOPHILS #: 0.07 K/UL
AUTO EOSINOPHILS %: 1 %
AUTO IMMATURE GRANULOCYTES #: 0.02 K/UL
AUTO LYMPHOCYTES #: 2.52 K/UL
AUTO LYMPHOCYTES %: 35 %
AUTO MONOCYTES #: 0.62 K/UL
AUTO MONOCYTES %: 8.6 %
AUTO NEUTROPHILS #: 3.95 K/UL
AUTO NEUTROPHILS %: 54.7 %
AUTO NRBC #: 0 K/UL
BILIRUB DIRECT SERPL-MCNC: <0.2 MG/DL
BILIRUB INDIRECT SERPL-MCNC: >0.2 MG/DL
BILIRUB SERPL-MCNC: 0.4 MG/DL
BUN SERPL-MCNC: 18 MG/DL
CALCIUM SERPL-MCNC: 10.1 MG/DL
CHLORIDE SERPL-SCNC: 101 MMOL/L
CO2 SERPL-SCNC: 24 MMOL/L
CREAT SERPL-MCNC: 0.9 MG/DL
EGFRCR SERPLBLD CKD-EPI 2021: 66 ML/MIN/1.73M2
FERRITIN SERPL-MCNC: 361 NG/ML
GLUCOSE SERPL-MCNC: 100 MG/DL
HCT VFR BLD CALC: 44.1 %
HGB BLD-MCNC: 14.1 G/DL
IMM GRANULOCYTES NFR BLD AUTO: 0.3 %
IRON SATN MFR SERPL: 30 %
IRON SERPL-MCNC: 84 UG/DL
MAN DIFF?: NORMAL
MCHC RBC-ENTMCNC: 28.3 PG
MCHC RBC-ENTMCNC: 32 G/DL
MCV RBC AUTO: 88.4 FL
PLATELET # BLD AUTO: 327 K/UL
PMV BLD AUTO: 0 /100 WBCS
PMV BLD: 10.1 FL
POTASSIUM SERPL-SCNC: 5.4 MMOL/L
PROT SERPL-MCNC: 7.1 G/DL
RBC # BLD: 4.99 M/UL
RBC # FLD: 14.9 %
RHEUMATOID FACT SER QL: 11 IU/ML
SODIUM SERPL-SCNC: 141 MMOL/L
TIBC SERPL-MCNC: 284 UG/DL
UIBC SERPL-MCNC: 200 UG/DL
WBC # FLD AUTO: 7.21 K/UL

## 2025-06-17 ENCOUNTER — OUTPATIENT (OUTPATIENT)
Dept: OUTPATIENT SERVICES | Facility: HOSPITAL | Age: 75
LOS: 1 days | End: 2025-06-17
Payer: MEDICARE

## 2025-06-17 ENCOUNTER — APPOINTMENT (OUTPATIENT)
Age: 75
End: 2025-06-17
Payer: MEDICARE

## 2025-06-17 VITALS
RESPIRATION RATE: 16 BRPM | SYSTOLIC BLOOD PRESSURE: 112 MMHG | OXYGEN SATURATION: 98 % | BODY MASS INDEX: 26.5 KG/M2 | HEART RATE: 107 BPM | HEIGHT: 61.81 IN | TEMPERATURE: 97.8 F | WEIGHT: 144 LBS | DIASTOLIC BLOOD PRESSURE: 65 MMHG

## 2025-06-17 DIAGNOSIS — Z98.891 HISTORY OF UTERINE SCAR FROM PREVIOUS SURGERY: Chronic | ICD-10-CM

## 2025-06-17 DIAGNOSIS — N60.09 SOLITARY CYST OF UNSPECIFIED BREAST: Chronic | ICD-10-CM

## 2025-06-17 DIAGNOSIS — C82.90 FOLLICULAR LYMPHOMA, UNSPECIFIED, UNSPECIFIED SITE: ICD-10-CM

## 2025-06-17 DIAGNOSIS — Z86.018 PERSONAL HISTORY OF OTHER BENIGN NEOPLASM: Chronic | ICD-10-CM

## 2025-06-17 PROBLEM — E61.1 IRON DEFICIENCY: Status: ACTIVE | Noted: 2025-06-17

## 2025-06-17 PROCEDURE — 99214 OFFICE O/P EST MOD 30 MIN: CPT

## 2025-06-17 PROCEDURE — G2211 COMPLEX E/M VISIT ADD ON: CPT

## 2025-06-19 ENCOUNTER — APPOINTMENT (OUTPATIENT)
Dept: RADIATION ONCOLOGY | Facility: HOSPITAL | Age: 75
End: 2025-06-19
Payer: MEDICARE

## 2025-06-19 ENCOUNTER — OUTPATIENT (OUTPATIENT)
Dept: OUTPATIENT SERVICES | Facility: HOSPITAL | Age: 75
LOS: 1 days | End: 2025-06-19
Payer: MEDICARE

## 2025-06-19 VITALS
HEART RATE: 100 BPM | SYSTOLIC BLOOD PRESSURE: 107 MMHG | DIASTOLIC BLOOD PRESSURE: 74 MMHG | WEIGHT: 144.13 LBS | TEMPERATURE: 98.1 F | BODY MASS INDEX: 26.52 KG/M2 | RESPIRATION RATE: 16 BRPM | OXYGEN SATURATION: 96 %

## 2025-06-19 DIAGNOSIS — Z86.018 PERSONAL HISTORY OF OTHER BENIGN NEOPLASM: Chronic | ICD-10-CM

## 2025-06-19 DIAGNOSIS — Z98.891 HISTORY OF UTERINE SCAR FROM PREVIOUS SURGERY: Chronic | ICD-10-CM

## 2025-06-19 DIAGNOSIS — C85.80 OTHER SPECIFIED TYPES OF NON-HODGKIN LYMPHOMA, UNSPECIFIED SITE: ICD-10-CM

## 2025-06-19 DIAGNOSIS — N60.09 SOLITARY CYST OF UNSPECIFIED BREAST: Chronic | ICD-10-CM

## 2025-06-19 PROCEDURE — 99214 OFFICE O/P EST MOD 30 MIN: CPT

## 2025-06-20 ENCOUNTER — OUTPATIENT (OUTPATIENT)
Dept: OUTPATIENT SERVICES | Facility: HOSPITAL | Age: 75
LOS: 1 days | End: 2025-06-20
Payer: MEDICARE

## 2025-06-20 DIAGNOSIS — Z86.018 PERSONAL HISTORY OF OTHER BENIGN NEOPLASM: Chronic | ICD-10-CM

## 2025-06-20 DIAGNOSIS — N60.09 SOLITARY CYST OF UNSPECIFIED BREAST: Chronic | ICD-10-CM

## 2025-06-20 DIAGNOSIS — Z98.891 HISTORY OF UTERINE SCAR FROM PREVIOUS SURGERY: Chronic | ICD-10-CM

## 2025-06-20 PROCEDURE — 99212 OFFICE O/P EST SF 10 MIN: CPT

## 2025-06-21 DIAGNOSIS — C85.80 OTHER SPECIFIED TYPES OF NON-HODGKIN LYMPHOMA, UNSPECIFIED SITE: ICD-10-CM

## 2025-06-23 DIAGNOSIS — C85.80 OTHER SPECIFIED TYPES OF NON-HODGKIN LYMPHOMA, UNSPECIFIED SITE: ICD-10-CM

## 2025-06-24 ENCOUNTER — APPOINTMENT (OUTPATIENT)
Dept: CARDIOLOGY | Facility: CLINIC | Age: 75
End: 2025-06-24
Payer: MEDICARE

## 2025-06-24 VITALS
SYSTOLIC BLOOD PRESSURE: 110 MMHG | BODY MASS INDEX: 26.43 KG/M2 | HEIGHT: 61 IN | DIASTOLIC BLOOD PRESSURE: 78 MMHG | WEIGHT: 140 LBS | HEART RATE: 100 BPM

## 2025-06-24 DIAGNOSIS — C85.80 OTHER SPECIFIED TYPES OF NON-HODGKIN LYMPHOMA, UNSPECIFIED SITE: ICD-10-CM

## 2025-06-24 PROCEDURE — 93000 ELECTROCARDIOGRAM COMPLETE: CPT

## 2025-06-24 PROCEDURE — 99214 OFFICE O/P EST MOD 30 MIN: CPT

## 2025-07-07 ENCOUNTER — RESULT REVIEW (OUTPATIENT)
Age: 75
End: 2025-07-07

## 2025-07-07 ENCOUNTER — OUTPATIENT (OUTPATIENT)
Dept: OUTPATIENT SERVICES | Facility: HOSPITAL | Age: 75
LOS: 1 days | End: 2025-07-07
Payer: MEDICARE

## 2025-07-07 DIAGNOSIS — Z86.018 PERSONAL HISTORY OF OTHER BENIGN NEOPLASM: Chronic | ICD-10-CM

## 2025-07-07 DIAGNOSIS — C82.90 FOLLICULAR LYMPHOMA, UNSPECIFIED, UNSPECIFIED SITE: ICD-10-CM

## 2025-07-07 DIAGNOSIS — N60.09 SOLITARY CYST OF UNSPECIFIED BREAST: Chronic | ICD-10-CM

## 2025-07-07 DIAGNOSIS — Z98.891 HISTORY OF UTERINE SCAR FROM PREVIOUS SURGERY: Chronic | ICD-10-CM

## 2025-07-07 LAB — GLUCOSE BLDC GLUCOMTR-MCNC: 78 MG/DL — SIGNIFICANT CHANGE UP (ref 70–99)

## 2025-07-07 PROCEDURE — 78815 PET IMAGE W/CT SKULL-THIGH: CPT | Mod: PS

## 2025-07-07 PROCEDURE — A9552: CPT

## 2025-07-07 PROCEDURE — 82962 GLUCOSE BLOOD TEST: CPT

## 2025-07-07 PROCEDURE — 78815 PET IMAGE W/CT SKULL-THIGH: CPT | Mod: 26,PS

## 2025-07-08 DIAGNOSIS — C82.90 FOLLICULAR LYMPHOMA, UNSPECIFIED, UNSPECIFIED SITE: ICD-10-CM

## 2025-07-09 PROCEDURE — 77470 SPECIAL RADIATION TREATMENT: CPT | Mod: 26

## 2025-08-05 ENCOUNTER — NON-APPOINTMENT (OUTPATIENT)
Age: 75
End: 2025-08-05

## 2025-08-05 VITALS
DIASTOLIC BLOOD PRESSURE: 72 MMHG | WEIGHT: 146.13 LBS | TEMPERATURE: 97.8 F | OXYGEN SATURATION: 98 % | RESPIRATION RATE: 16 BRPM | BODY MASS INDEX: 27.61 KG/M2 | SYSTOLIC BLOOD PRESSURE: 105 MMHG | HEART RATE: 87 BPM

## 2025-08-12 ENCOUNTER — NON-APPOINTMENT (OUTPATIENT)
Age: 75
End: 2025-08-12

## 2025-08-12 VITALS
HEART RATE: 98 BPM | BODY MASS INDEX: 27.68 KG/M2 | DIASTOLIC BLOOD PRESSURE: 60 MMHG | SYSTOLIC BLOOD PRESSURE: 87 MMHG | OXYGEN SATURATION: 98 % | RESPIRATION RATE: 16 BRPM | WEIGHT: 146.5 LBS

## 2025-08-12 DIAGNOSIS — C82.90 FOLLICULAR LYMPHOMA, UNSPECIFIED, UNSPECIFIED SITE: ICD-10-CM

## 2025-08-12 RX ORDER — ATOGEPANT 60 MG/1
60 TABLET ORAL
Refills: 0 | Status: ACTIVE | COMMUNITY

## 2025-08-12 RX ORDER — DEXLANSOPRAZOLE 30 MG/1
30 CAPSULE, DELAYED RELEASE ORAL
Refills: 0 | Status: ACTIVE | COMMUNITY

## 2025-08-12 RX ORDER — VIT C/E/ZN/COPPR/LUTEIN/ZEAXAN 250MG-90MG
CAPSULE ORAL
Refills: 0 | Status: ACTIVE | COMMUNITY

## 2025-08-19 ENCOUNTER — NON-APPOINTMENT (OUTPATIENT)
Age: 75
End: 2025-08-19

## 2025-08-19 VITALS
TEMPERATURE: 98.5 F | SYSTOLIC BLOOD PRESSURE: 112 MMHG | RESPIRATION RATE: 16 BRPM | WEIGHT: 146.7 LBS | DIASTOLIC BLOOD PRESSURE: 82 MMHG | OXYGEN SATURATION: 100 % | BODY MASS INDEX: 27.72 KG/M2 | HEART RATE: 77 BPM

## 2025-08-27 ENCOUNTER — APPOINTMENT (OUTPATIENT)
Age: 75
End: 2025-08-27